# Patient Record
Sex: FEMALE | Race: WHITE | Employment: FULL TIME | URBAN - METROPOLITAN AREA
[De-identification: names, ages, dates, MRNs, and addresses within clinical notes are randomized per-mention and may not be internally consistent; named-entity substitution may affect disease eponyms.]

---

## 2022-03-10 ENCOUNTER — APPOINTMENT (OUTPATIENT)
Dept: CT IMAGING | Facility: CLINIC | Age: 57
End: 2022-03-10
Payer: MEDICARE

## 2022-03-10 ENCOUNTER — HOSPITAL ENCOUNTER (EMERGENCY)
Facility: CLINIC | Age: 57
Discharge: HOME | End: 2022-03-11
Attending: EMERGENCY MEDICINE
Payer: MEDICARE

## 2022-03-10 DIAGNOSIS — J40 BRONCHITIS: ICD-10-CM

## 2022-03-10 DIAGNOSIS — R06.02 SHORTNESS OF BREATH: Primary | ICD-10-CM

## 2022-03-10 LAB
ABSOLUTE EOS #: 0.1 K/UL (ref 0–0.4)
ABSOLUTE LYMPH #: 1 K/UL (ref 1–4.8)
ABSOLUTE MONO #: 0.5 K/UL (ref 0.1–1.2)
ANION GAP SERPL CALCULATED.3IONS-SCNC: 14 MMOL/L (ref 9–17)
BASOPHILS # BLD: 1 % (ref 0–2)
BASOPHILS ABSOLUTE: 0.1 K/UL (ref 0–0.2)
BUN BLDV-MCNC: 8 MG/DL (ref 6–20)
CALCIUM SERPL-MCNC: 9.7 MG/DL (ref 8.6–10.4)
CHLORIDE BLD-SCNC: 93 MMOL/L (ref 98–107)
CO2: 25 MMOL/L (ref 20–31)
CREAT SERPL-MCNC: 0.5 MG/DL (ref 0.5–0.9)
D-DIMER QUANTITATIVE: 0.64 MG/L FEU
DIRECT EXAM: NORMAL
EOSINOPHILS RELATIVE PERCENT: 1 % (ref 1–4)
GFR AFRICAN AMERICAN: >60 ML/MIN
GFR NON-AFRICAN AMERICAN: >60 ML/MIN
GFR SERPL CREATININE-BSD FRML MDRD: ABNORMAL ML/MIN/{1.73_M2}
GLUCOSE BLD-MCNC: 115 MG/DL (ref 70–99)
HCT VFR BLD CALC: 40.1 % (ref 36–46)
HEMOGLOBIN: 13.8 G/DL (ref 12–16)
LYMPHOCYTES # BLD: 6 % (ref 24–44)
MCH RBC QN AUTO: 29.9 PG (ref 26–34)
MCHC RBC AUTO-ENTMCNC: 34.5 G/DL (ref 31–37)
MCV RBC AUTO: 86.6 FL (ref 80–100)
MONOCYTES # BLD: 3 % (ref 2–11)
PDW BLD-RTO: 14.6 % (ref 12.5–15.4)
PLATELET # BLD: 221 K/UL (ref 140–450)
PMV BLD AUTO: 8.6 FL (ref 6–12)
POTASSIUM SERPL-SCNC: 4 MMOL/L (ref 3.7–5.3)
RBC # BLD: 4.63 M/UL (ref 4–5.2)
SEG NEUTROPHILS: 89 % (ref 36–66)
SEGMENTED NEUTROPHILS ABSOLUTE COUNT: 14.3 K/UL (ref 1.8–7.7)
SODIUM BLD-SCNC: 132 MMOL/L (ref 135–144)
SPECIMEN DESCRIPTION: NORMAL
TROPONIN, HIGH SENSITIVITY: 8 NG/L (ref 0–14)
WBC # BLD: 16.1 K/UL (ref 3.5–11)

## 2022-03-10 PROCEDURE — 71260 CT THORAX DX C+: CPT

## 2022-03-10 PROCEDURE — 87804 INFLUENZA ASSAY W/OPTIC: CPT

## 2022-03-10 PROCEDURE — 85025 COMPLETE CBC W/AUTO DIFF WBC: CPT

## 2022-03-10 PROCEDURE — 6360000004 HC RX CONTRAST MEDICATION: Performed by: REGISTERED NURSE

## 2022-03-10 PROCEDURE — 93005 ELECTROCARDIOGRAM TRACING: CPT | Performed by: REGISTERED NURSE

## 2022-03-10 PROCEDURE — U0003 INFECTIOUS AGENT DETECTION BY NUCLEIC ACID (DNA OR RNA); SEVERE ACUTE RESPIRATORY SYNDROME CORONAVIRUS 2 (SARS-COV-2) (CORONAVIRUS DISEASE [COVID-19]), AMPLIFIED PROBE TECHNIQUE, MAKING USE OF HIGH THROUGHPUT TECHNOLOGIES AS DESCRIBED BY CMS-2020-01-R: HCPCS

## 2022-03-10 PROCEDURE — 36415 COLL VENOUS BLD VENIPUNCTURE: CPT

## 2022-03-10 PROCEDURE — 99283 EMERGENCY DEPT VISIT LOW MDM: CPT

## 2022-03-10 PROCEDURE — 2580000003 HC RX 258: Performed by: REGISTERED NURSE

## 2022-03-10 PROCEDURE — 96374 THER/PROPH/DIAG INJ IV PUSH: CPT

## 2022-03-10 PROCEDURE — 85379 FIBRIN DEGRADATION QUANT: CPT

## 2022-03-10 PROCEDURE — 80048 BASIC METABOLIC PNL TOTAL CA: CPT

## 2022-03-10 PROCEDURE — 84484 ASSAY OF TROPONIN QUANT: CPT

## 2022-03-10 PROCEDURE — U0005 INFEC AGEN DETEC AMPLI PROBE: HCPCS

## 2022-03-10 RX ORDER — METOPROLOL SUCCINATE 100 MG/1
TABLET, EXTENDED RELEASE ORAL
COMMUNITY

## 2022-03-10 RX ORDER — 0.9 % SODIUM CHLORIDE 0.9 %
70 INTRAVENOUS SOLUTION INTRAVENOUS ONCE
Status: COMPLETED | OUTPATIENT
Start: 2022-03-10 | End: 2022-03-10

## 2022-03-10 RX ORDER — LOSARTAN POTASSIUM AND HYDROCHLOROTHIAZIDE 25; 100 MG/1; MG/1
TABLET ORAL
COMMUNITY

## 2022-03-10 RX ORDER — MONTELUKAST SODIUM 10 MG/1
TABLET ORAL
COMMUNITY

## 2022-03-10 RX ORDER — 0.9 % SODIUM CHLORIDE 0.9 %
1000 INTRAVENOUS SOLUTION INTRAVENOUS ONCE
Status: COMPLETED | OUTPATIENT
Start: 2022-03-10 | End: 2022-03-10

## 2022-03-10 RX ORDER — SODIUM CHLORIDE 0.9 % (FLUSH) 0.9 %
10 SYRINGE (ML) INJECTION PRN
Status: DISCONTINUED | OUTPATIENT
Start: 2022-03-10 | End: 2022-03-11 | Stop reason: HOSPADM

## 2022-03-10 RX ORDER — OMEPRAZOLE 40 MG/1
CAPSULE, DELAYED RELEASE ORAL
COMMUNITY

## 2022-03-10 RX ORDER — PRIMIDONE 250 MG/1
250 TABLET ORAL 4 TIMES DAILY
COMMUNITY

## 2022-03-10 RX ADMIN — IOPAMIDOL 75 ML: 755 INJECTION, SOLUTION INTRAVENOUS at 23:25

## 2022-03-10 RX ADMIN — SODIUM CHLORIDE 70 ML: 9 INJECTION, SOLUTION INTRAVENOUS at 23:36

## 2022-03-10 RX ADMIN — SODIUM CHLORIDE 1000 ML: 9 INJECTION, SOLUTION INTRAVENOUS at 21:57

## 2022-03-10 ASSESSMENT — ENCOUNTER SYMPTOMS
EYES NEGATIVE: 1
STRIDOR: 0
WHEEZING: 0
COUGH: 1
CHEST TIGHTNESS: 1
GASTROINTESTINAL NEGATIVE: 1
SHORTNESS OF BREATH: 1

## 2022-03-11 VITALS
BODY MASS INDEX: 51.91 KG/M2 | SYSTOLIC BLOOD PRESSURE: 136 MMHG | RESPIRATION RATE: 22 BRPM | TEMPERATURE: 99.3 F | OXYGEN SATURATION: 95 % | HEART RATE: 80 BPM | HEIGHT: 63 IN | WEIGHT: 293 LBS | DIASTOLIC BLOOD PRESSURE: 69 MMHG

## 2022-03-11 PROCEDURE — 6360000002 HC RX W HCPCS: Performed by: EMERGENCY MEDICINE

## 2022-03-11 PROCEDURE — 6370000000 HC RX 637 (ALT 250 FOR IP): Performed by: EMERGENCY MEDICINE

## 2022-03-11 RX ORDER — METHYLPREDNISOLONE SODIUM SUCCINATE 40 MG/ML
40 INJECTION, POWDER, LYOPHILIZED, FOR SOLUTION INTRAMUSCULAR; INTRAVENOUS ONCE
Status: COMPLETED | OUTPATIENT
Start: 2022-03-11 | End: 2022-03-11

## 2022-03-11 RX ORDER — ALBUTEROL SULFATE 90 UG/1
2 AEROSOL, METERED RESPIRATORY (INHALATION) EVERY 4 HOURS PRN
Qty: 18 G | Refills: 0 | Status: SHIPPED | OUTPATIENT
Start: 2022-03-11

## 2022-03-11 RX ORDER — AZITHROMYCIN 250 MG/1
500 TABLET, FILM COATED ORAL ONCE
Status: COMPLETED | OUTPATIENT
Start: 2022-03-11 | End: 2022-03-11

## 2022-03-11 RX ORDER — AZITHROMYCIN 250 MG/1
TABLET, FILM COATED ORAL
Qty: 1 PACKET | Refills: 0 | Status: SHIPPED | OUTPATIENT
Start: 2022-03-11 | End: 2022-03-15

## 2022-03-11 RX ORDER — IPRATROPIUM BROMIDE AND ALBUTEROL SULFATE 2.5; .5 MG/3ML; MG/3ML
1 SOLUTION RESPIRATORY (INHALATION) ONCE
Status: COMPLETED | OUTPATIENT
Start: 2022-03-11 | End: 2022-03-11

## 2022-03-11 RX ORDER — PREDNISONE 10 MG/1
TABLET ORAL
Qty: 20 TABLET | Refills: 0 | Status: SHIPPED | OUTPATIENT
Start: 2022-03-11 | End: 2022-03-21

## 2022-03-11 RX ADMIN — AZITHROMYCIN MONOHYDRATE 500 MG: 250 TABLET ORAL at 00:14

## 2022-03-11 RX ADMIN — METHYLPREDNISOLONE SODIUM SUCCINATE 40 MG: 40 INJECTION, POWDER, FOR SOLUTION INTRAMUSCULAR; INTRAVENOUS at 00:15

## 2022-03-11 RX ADMIN — IPRATROPIUM BROMIDE AND ALBUTEROL SULFATE 1 AMPULE: 2.5; .5 SOLUTION RESPIRATORY (INHALATION) at 00:15

## 2022-03-11 NOTE — ED PROVIDER NOTES
[facility] ED  [address] [facility]  Phone: [phone]      Attending Physician Attestation    I performed a history and physical examination of the patient and discussed management with the mid level provideer. I reviewed the mid level provider's note and agree with the documented findings and plan of care. Any areas of disagreement are noted on the chart. I was personally present for the key portions of any procedures. I have documented in the chart those procedures where I was not present during the key portions. I have reviewed the emergency nurses triage note. I agree with the chief complaint, past medical history, past surgical history, allergies, medications, social and family history as documented unless otherwise noted below. Documentation of the HPI, Physical Exam and Medical Decision Making performed by mid level providers is based on my personal performance of the HPI, PE and MDM. For Physician Assistant/ Nurse Practitioner cases/documentation I have personally evaluated this patient and have completed at least one if not all key elements of the E/M (history, physical exam, and MDM). Additional findings are as noted. CHIEF COMPLAINT       Chief Complaint   Patient presents with    Shortness of Breath         HISTORY OF PRESENT ILLNESS    Aaron Harper is a 61 y.o. female who presents to the emergency department for evaluation of a 3-day history of cough and shortness of breath. Patient states she really just feels like she cannot get a good deep breath. She also states she has had multiple bouts with pneumonia throughout her lifetime. She is afebrile here and has no chest pain. She just took her home took Covid test that was negative. PAST MEDICAL HISTORY    has no past medical history on file. SURGICAL HISTORY      has no past surgical history on file.     CURRENT MEDICATIONS       Previous Medications    LOSARTAN-HYDROCHLOROTHIAZIDE (HYZAAR) 100-25 MG PER TABLET    Hyzaar 100 mg-25 mg tablet   Take 1 tablet every day by oral route. METOPROLOL SUCCINATE (TOPROL XL) 100 MG EXTENDED RELEASE TABLET    Toprol  mg tablet,extended release   Take 1 tablet twice a day by oral route. MONTELUKAST (SINGULAIR) 10 MG TABLET    Singulair 10 mg tablet   Take 1 tablet every day by oral route. OMEPRAZOLE (PRILOSEC) 40 MG DELAYED RELEASE CAPSULE    omeprazole 40 mg capsule,delayed release   Take 1 capsule twice a day by oral route. PRIMIDONE (MYSOLINE) 250 MG TABLET    Take 250 mg by mouth 4 times daily       ALLERGIES     is allergic to adhesive tape, asa [aspirin], and bactrim [sulfamethoxazole-trimethoprim]. FAMILY HISTORY     has no family status information on file. family history is not on file. SOCIAL HISTORY      reports that she has never smoked. She has never used smokeless tobacco. She reports previous alcohol use. She reports that she does not use drugs. PHYSICAL EXAM     INITIAL VITALS:  height is 5' 3\" (1.6 m) and weight is 133.8 kg (295 lb). Her oral temperature is 99.3 °F (37.4 °C). Her blood pressure is 136/69 and her pulse is 80. Her respiration is 22 and oxygen saturation is 95%. Constitutional: Alert, oriented x3, nontoxic, afebrile, answering questions appropriately, acting properly for age, in no acute distress  HEENT: Extraocular muscles intact, mucus membranes moist, TMs clear bilaterally, no posterior pharyngeal erythema or exudates, Pupils equal, round, reactive to light,   Neck: Trachea midline, Supple without lymphadenopathy, no posterior midline neck tenderness to palpation  Cardiovascular: Regular rhythm and rate no S3, S4, or murmurs  Respiratory: Clear to auscultation bilaterally no wheezes, rhonchi, rales, no respiratory distress. Expiratory wheezes noted bilaterally but no accessory muscle use. Gastrointestinal: Soft, nontender, nondistended, positive bowel sounds.   No rebound, rigidity, or guarding. Musculoskeletal: No extremity pain or swelling  Neurologic: Moving all 4 extremities without difficulty there are no gross focal neurologic deficits  Skin: Warm and dry          DIAGNOSTIC RESULTS     EKG: All EKG's are interpreted by the Emergency Department Physician who either signs or Co-signs this chart in the absence of a cardiologist.        RADIOLOGY:   Non-plain film images such as CT, Ultrasound and MRI are read by the radiologist. Ataasuncion Lamb radiographic images are visualized and the radiologist interpretations are reviewed as follows:         LABS:  Results for orders placed or performed during the hospital encounter of 03/10/22   Rapid Influenza A/B Antigens    Specimen: Nasopharyngeal Swab   Result Value Ref Range    Specimen Description . NASOPHARYNGEAL SWAB     Direct Exam       NEGATIVE for Influenza A + B antigens. PCR testing to confirm this result is available upon request.  Specimen will be saved in the laboratory for 7 days. Please call [phone] if PCR testing is indicated.    Basic Metabolic Panel   Result Value Ref Range    Glucose 115 (H) 70 - 99 mg/dL    BUN 8 6 - 20 mg/dL    CREATININE 0.50 0.50 - 0.90 mg/dL    Calcium 9.7 8.6 - 10.4 mg/dL    Sodium 132 (L) 135 - 144 mmol/L    Potassium 4.0 3.7 - 5.3 mmol/L    Chloride 93 (L) 98 - 107 mmol/L    CO2 25 20 - 31 mmol/L    Anion Gap 14 9 - 17 mmol/L    GFR Non-African American >60 >60 mL/min    GFR African American >60 >60 mL/min    GFR Comment         CBC with Auto Differential   Result Value Ref Range    WBC 16.1 (H) 3.5 - 11.0 k/uL    RBC 4.63 4.0 - 5.2 m/uL    Hemoglobin 13.8 12.0 - 16.0 g/dL    Hematocrit 40.1 36 - 46 %    MCV 86.6 80 - 100 fL    MCH 29.9 26 - 34 pg    MCHC 34.5 31 - 37 g/dL    RDW 14.6 12.5 - 15.4 %    Platelets 582 882 - 073 k/uL    MPV 8.6 6.0 - 12.0 fL    Seg Neutrophils 89 (H) 36 - 66 %    Lymphocytes 6 (L) 24 - 44 %    Monocytes 3 2 - 11 %    Eosinophils % 1 1 - 4 % Basophils 1 0 - 2 %    Segs Absolute 14.30 (H) 1.8 - 7.7 k/uL    Absolute Lymph # 1.00 1.0 - 4.8 k/uL    Absolute Mono # 0.50 0.1 - 1.2 k/uL    Absolute Eos # 0.10 0.0 - 0.4 k/uL    Basophils Absolute 0.10 0.0 - 0.2 k/uL   Troponin   Result Value Ref Range    Troponin, High Sensitivity 8 0 - 14 ng/L   D-Dimer, Quantitative   Result Value Ref Range    D-Dimer, Quant 0.64 mg/L FEU                 EMERGENCY DEPARTMENT COURSE:   Vitals:    Vitals:    03/10/22 2043 03/11/22 0004   BP: (!) 188/100 136/69   Pulse: 91 80   Resp: 22    Temp: 99.3 °F (37.4 °C)    TempSrc: Oral    SpO2: 95% 95%   Weight: 133.8 kg (295 lb)    Height: 5' 3\" (1.6 m)      -------------------------  BP: 136/69, Temp: 99.3 °F (37.4 °C), Pulse: 80, Resp: 22      PERTINENT ATTENDING PHYSICIAN COMMENTS:    Patient has gotten laboratory evaluation she has slightly elevated D-dimer so she got a CT scan which did not reveal any pulmonary embolus or groundglass opacities. Patient however however distal have some persistent wheezing we will go ahead and give her on Atrovent and albuterol treatment some steroids and started on some Zithromax. (Please note that portions of this note were completed with a voice recognition program.  Efforts were made to edit the dictations but occasionally words are mis-transcribed.)    Yanick Mcbride MD,, MD, F.A.C.E.P.   Attending Emergency Medicine Physician       Yanick Mcbride MD  03/11/22 2069

## 2022-03-11 NOTE — ED NOTES
The following labs labeled with pt sticker and tubed to lab:     [x] Blue     [x] Lavender   [] on ice  [x] Green/yellow  [] Green/black [] on ice  [] Yellow  [] Red  [] Pink      [x] COVID-19 swab    [] Rapid  [x] PCR  [x] Flu swab  [] Peds Viral Panel     [] Urine Sample  [] Pelvic Cultures  [] Blood Cultures        Ervin Paul RN  03/10/22 9691

## 2022-03-11 NOTE — ED PROVIDER NOTES
[facility] ED  [address] [facility]  Phone: [phone]        Pt Name: Jt Elizabeth  MRN: [id number]  [place] 1965  Date of evaluation: 3/10/22    CHIEFCOMPLAINT       Chief Complaint   Patient presents with    Shortness of Breath       HISTORY OF PRESENT ILLNESS (Location/Symptom, Timing/Onset, Context/Setting, Quality, Duration, Modifying Factors, Severity)      Jt Elizabeth is a 61 y.o. female with no pertinent PMH who presents to the ED via private auto with shortness of breath and cough ongoing since Tuesday this week. States that he has had a continuous cough with intermittent fatigue and states that throughout the day her cough and symptoms tend to get worse. She denies any fevers or chills, chest pain, abdominal pain, nausea vomiting diarrhea but she does report difficulty taking a \"deep breath\" and some chest tightness. She has not taken medication prior arrival for symptoms. She states that nothing makes her symptoms better or worse. She states that she has a history of pneumonia multiple times and states that she is concerned that she is developing a pneumonia. She did take a Covid test at home which was negative. She denies any long car rides, history of cancers, hormone therapy, or recent surgeries, or unilateral leg swelling. She states that she does not smoke as well. PAST MEDICAL / SURGICAL / SOCIAL / FAMILY HISTORY     PMH:  has no past medical history on file. Surgical History:  has no past surgical history on file. Social History:  reports that she has never smoked. She has never used smokeless tobacco. She reports previous alcohol use. She reports that she does not use drugs. Family History: has no family status information on file. family history is not on file.   Psychiatric History: None    Allergies: Adhesive tape, Asa [aspirin], and Bactrim [sulfamethoxazole-trimethoprim]    Home Medications:   Prior to Admission medications Medication Sig Start Date End Date Taking? Authorizing Provider   primidone (MYSOLINE) 250 MG tablet Take 250 mg by mouth 4 times daily   Yes Historical Provider, MD   losartan-hydroCHLOROthiazide (HYZAAR) 100-25 MG per tablet Hyzaar 100 mg-25 mg tablet   Take 1 tablet every day by oral route. Historical Provider, MD   metoprolol succinate (TOPROL XL) 100 MG extended release tablet Toprol  mg tablet,extended release   Take 1 tablet twice a day by oral route. Historical Provider, MD   montelukast (SINGULAIR) 10 MG tablet Singulair 10 mg tablet   Take 1 tablet every day by oral route. Historical Provider, MD   omeprazole (PRILOSEC) 40 MG delayed release capsule omeprazole 40 mg capsule,delayed release   Take 1 capsule twice a day by oral route. Historical Provider, MD       REVIEW OF SYSTEMS  (2-9 systems for level 4, 10 ormore for level 5)      Review of Systems   Constitutional: Positive for fatigue. Negative for chills and fever. HENT: Negative. Eyes: Negative. Respiratory: Positive for cough, chest tightness and shortness of breath. Negative for wheezing and stridor. Cardiovascular: Negative. Gastrointestinal: Negative. Genitourinary: Negative. Musculoskeletal: Negative. Skin: Negative. Neurological: Negative. All other systems negative except as marked. PHYSICAL EXAM  (up to 7 for level 4, 8 or more for level 5)      INITIAL VITALS:  height is 5' 3\" (1.6 m) and weight is 133.8 kg (295 lb). Her oral temperature is 99.3 °F (37.4 °C). Her blood pressure is 188/100 (abnormal) and her pulse is 91. Her respiration is 22 and oxygen saturation is 95%. Vital signs reviewed. Physical Exam  Constitutional:       General: She is not in acute distress. Appearance: She is well-developed. She is obese. She is not toxic-appearing. HENT:      Head: Normocephalic and atraumatic.       Mouth/Throat:      Mouth: Mucous membranes are moist.      Pharynx: Oropharynx is clear. Neck:      Vascular: No JVD. Trachea: No tracheal deviation. Cardiovascular:      Rate and Rhythm: Normal rate and regular rhythm. Pulses: Normal pulses. Heart sounds: Normal heart sounds. Pulmonary:      Effort: Pulmonary effort is normal. Tachypnea present. No accessory muscle usage or respiratory distress. Breath sounds: Normal breath sounds. No stridor. No decreased breath sounds, wheezing, rhonchi or rales. Abdominal:      General: Bowel sounds are normal.      Palpations: Abdomen is soft. There is no hepatomegaly, splenomegaly or mass. Tenderness: There is no abdominal tenderness. There is no guarding. Musculoskeletal:         General: Normal range of motion. Cervical back: Normal range of motion and neck supple. Right lower leg: No tenderness. No edema. Left lower leg: No tenderness. No edema. Lymphadenopathy:      Cervical: No cervical adenopathy. Skin:     General: Skin is warm and dry. Capillary Refill: Capillary refill takes less than 2 seconds. Neurological:      General: No focal deficit present. Mental Status: She is alert. Psychiatric:         Mood and Affect: Mood normal.         Behavior: Behavior normal.           DIFFERENTIAL DIAGNOSIS / MDM     After my physical exam, I do have concern for ACS, PE, flu, Covid, pneumonia to be causing patient symptoms. I will obtain a rapid flu, send out Covid, EKG, troponin, D-dimer and basic labs. CBC shows leukocytosis 16.1. Rapid flu was negative. Troponin was negative. BMP shows mild hyponatremia at 132, we will provide patient with a normal saline bolus at this time. D-dimer is elevated, we will obtain a CTA of the chest to rule out pulmonary embolism. Attending to complete all remaining care, diagnosis and disposition for this patient. We discussed this patient prior to my departure.   My note will be refreshed to reflect these details, though I was not actively involved in this patient's care following the time stamp at 2333. PLAN (LABS / IMAGING / EKG):  Orders Placed This Encounter   Procedures    Rapid Influenza A/B Antigens    CT CHEST PULMONARY EMBOLISM W CONTRAST    Basic Metabolic Panel    CBC with Auto Differential    Troponin    D-Dimer, Quantitative    COVID-19    EKG 12 Lead    Place CT Compatible peripheral IV       MEDICATIONS ORDERED:  Orders Placed This Encounter   Medications    0.9 % sodium chloride bolus    0.9 % sodium chloride bolus    iopamidol (ISOVUE-370) 76 % injection 75 mL    sodium chloride flush 0.9 % injection 10 mL       Controlled Substances Monitoring:     DIAGNOSTIC RESULTS     EKG: All EKG's are interpreted by the Emergency Department Physician who either signs or Co-signs this chart in the absenceof a cardiologist.        RADIOLOGY: All images are read by the radiologist and their interpretations are reviewed. CT CHEST PULMONARY EMBOLISM W CONTRAST    (Results Pending)       No results found. LABS:  Results for orders placed or performed during the hospital encounter of 03/10/22   Rapid Influenza A/B Antigens    Specimen: Nasopharyngeal Swab   Result Value Ref Range    Specimen Description . NASOPHARYNGEAL SWAB     Direct Exam       NEGATIVE for Influenza A + B antigens. PCR testing to confirm this result is available upon request.  Specimen will be saved in the laboratory for 7 days. Please call [phone] if PCR testing is indicated.    Basic Metabolic Panel   Result Value Ref Range    Glucose 115 (H) 70 - 99 mg/dL    BUN 8 6 - 20 mg/dL    CREATININE 0.50 0.50 - 0.90 mg/dL    Calcium 9.7 8.6 - 10.4 mg/dL    Sodium 132 (L) 135 - 144 mmol/L    Potassium 4.0 3.7 - 5.3 mmol/L    Chloride 93 (L) 98 - 107 mmol/L    CO2 25 20 - 31 mmol/L    Anion Gap 14 9 - 17 mmol/L    GFR Non-African American >60 >60 mL/min    GFR African American >60 >60 mL/min    GFR Comment         CBC with Auto Differential   Result Value Ref Range    WBC 16.1 (H) 3.5 - 11.0 k/uL    RBC 4.63 4.0 - 5.2 m/uL    Hemoglobin 13.8 12.0 - 16.0 g/dL    Hematocrit 40.1 36 - 46 %    MCV 86.6 80 - 100 fL    MCH 29.9 26 - 34 pg    MCHC 34.5 31 - 37 g/dL    RDW 14.6 12.5 - 15.4 %    Platelets 863 418 - 156 k/uL    MPV 8.6 6.0 - 12.0 fL    Seg Neutrophils 89 (H) 36 - 66 %    Lymphocytes 6 (L) 24 - 44 %    Monocytes 3 2 - 11 %    Eosinophils % 1 1 - 4 %    Basophils 1 0 - 2 %    Segs Absolute 14.30 (H) 1.8 - 7.7 k/uL    Absolute Lymph # 1.00 1.0 - 4.8 k/uL    Absolute Mono # 0.50 0.1 - 1.2 k/uL    Absolute Eos # 0.10 0.0 - 0.4 k/uL    Basophils Absolute 0.10 0.0 - 0.2 k/uL   Troponin   Result Value Ref Range    Troponin, High Sensitivity 8 0 - 14 ng/L   D-Dimer, Quantitative   Result Value Ref Range    D-Dimer, Quant 0.64 mg/L FEU       EMERGENCY DEPARTMENT COURSE     ED Course as of 03/10/22 2334   Thu Mar 10, 2022   2334 Attending to complete all remaining care, diagnosis and disposition for this patient. We discussed this patient prior to my departure. My note will be refreshed to reflect these details, though I was not actively involved in this patient's care following the time stamp. [TM]      ED Course User Index  [TM] Benoit Charter LEISA Baltazar - CNP        Vitals:    Vitals:    03/10/22 2043   BP: (!) 188/100   Pulse: 91   Resp: 22   Temp: 99.3 °F (37.4 °C)   TempSrc: Oral   SpO2: 95%   Weight: 133.8 kg (295 lb)   Height: 5' 3\" (1.6 m)     -------------------------  BP: (!) 188/100, Temp: 99.3 °F (37.4 °C), Pulse: 91, Resp: 22      RE-EVALUATION:  See ED Course notes above. CONSULTS:  None    PROCEDURES:  None    FINAL IMPRESSION      1. Shortness of breath          DISPOSITION / PLAN     CONDITION ON DISPOSITION:   Stable    PATIENT REFERRED TO:  No follow-up provider specified.     DISCHARGE MEDICATIONS:  New Prescriptions    No medications on file       Bobbi Vallecillo, APRN - [place]   Emergency Medicine Nurse

## 2022-03-11 NOTE — ED NOTES
Pt presents to ED ambulatory a&ox 4. Pt comes with complaints of sob since Tuesday. Pt states she feels like she cannot get a good deep breath. Hx of pneumonia. Denies CP. Pt states that she took at home covid test the other day that was negative.       Lexa Beltran RN  03/10/22 2046 Yes - the patient is able to be screened

## 2022-03-12 LAB
SARS-COV-2: NORMAL
SARS-COV-2: NOT DETECTED
SOURCE: NORMAL

## 2022-03-13 LAB
EKG ATRIAL RATE: 86 BPM
EKG P AXIS: 68 DEGREES
EKG P-R INTERVAL: 120 MS
EKG Q-T INTERVAL: 400 MS
EKG QRS DURATION: 132 MS
EKG QTC CALCULATION (BAZETT): 478 MS
EKG R AXIS: -58 DEGREES
EKG T AXIS: 9 DEGREES
EKG VENTRICULAR RATE: 86 BPM

## 2022-08-02 ENCOUNTER — APPOINTMENT (OUTPATIENT)
Dept: GENERAL RADIOLOGY | Facility: CLINIC | Age: 57
End: 2022-08-02
Payer: MEDICARE

## 2022-08-02 ENCOUNTER — APPOINTMENT (OUTPATIENT)
Dept: CT IMAGING | Facility: CLINIC | Age: 57
End: 2022-08-02
Payer: MEDICARE

## 2022-08-02 ENCOUNTER — HOSPITAL ENCOUNTER (EMERGENCY)
Facility: CLINIC | Age: 57
Discharge: HOME | End: 2022-08-02
Attending: EMERGENCY MEDICINE
Payer: MEDICARE

## 2022-08-02 VITALS
OXYGEN SATURATION: 98 % | RESPIRATION RATE: 16 BRPM | HEART RATE: 67 BPM | TEMPERATURE: 98.2 F | WEIGHT: 293 LBS | HEIGHT: 63 IN | DIASTOLIC BLOOD PRESSURE: 81 MMHG | SYSTOLIC BLOOD PRESSURE: 160 MMHG | BODY MASS INDEX: 51.91 KG/M2

## 2022-08-02 DIAGNOSIS — R42 VERTIGO: Primary | ICD-10-CM

## 2022-08-02 LAB
-: ABNORMAL
ABSOLUTE EOS #: 0.1 K/UL (ref 0–0.4)
ABSOLUTE LYMPH #: 2.1 K/UL (ref 1–4.8)
ABSOLUTE MONO #: 0.3 K/UL (ref 0.1–1.2)
ALBUMIN SERPL-MCNC: 4.5 G/DL (ref 3.5–5.2)
ALBUMIN/GLOBULIN RATIO: 1.4 (ref 1–2.5)
ALP BLD-CCNC: 83 U/L (ref 35–104)
ALT SERPL-CCNC: 13 U/L (ref 5–33)
ANION GAP SERPL CALCULATED.3IONS-SCNC: 12 MMOL/L (ref 9–17)
AST SERPL-CCNC: 16 U/L
BACTERIA: ABNORMAL
BASOPHILS # BLD: 0 % (ref 0–2)
BASOPHILS ABSOLUTE: 0 K/UL (ref 0–0.2)
BILIRUB SERPL-MCNC: 0.4 MG/DL (ref 0.3–1.2)
BILIRUBIN URINE: NEGATIVE
BUN BLDV-MCNC: 11 MG/DL (ref 6–20)
CALCIUM SERPL-MCNC: 9.3 MG/DL (ref 8.6–10.4)
CHLORIDE BLD-SCNC: 101 MMOL/L (ref 98–107)
CO2: 27 MMOL/L (ref 20–31)
COLOR: YELLOW
CREAT SERPL-MCNC: 0.6 MG/DL (ref 0.5–0.9)
EOSINOPHILS RELATIVE PERCENT: 2 % (ref 1–4)
EPITHELIAL CELLS UA: ABNORMAL /HPF (ref 0–5)
GFR AFRICAN AMERICAN: >60 ML/MIN
GFR NON-AFRICAN AMERICAN: >60 ML/MIN
GFR SERPL CREATININE-BSD FRML MDRD: ABNORMAL ML/MIN/{1.73_M2}
GLUCOSE BLD-MCNC: 116 MG/DL (ref 70–99)
GLUCOSE URINE: NEGATIVE
HCT VFR BLD CALC: 43 % (ref 36–46)
HEMOGLOBIN: 14.2 G/DL (ref 12–16)
KETONES, URINE: NEGATIVE
LEUKOCYTE ESTERASE, URINE: ABNORMAL
LYMPHOCYTES # BLD: 28 % (ref 24–44)
MCH RBC QN AUTO: 29.3 PG (ref 26–34)
MCHC RBC AUTO-ENTMCNC: 33.1 G/DL (ref 31–37)
MCV RBC AUTO: 88.4 FL (ref 80–100)
MONOCYTES # BLD: 4 % (ref 2–11)
NITRITE, URINE: NEGATIVE
OTHER OBSERVATIONS UA: ABNORMAL
PDW BLD-RTO: 14.1 % (ref 12.5–15.4)
PH UA: 5.5 (ref 5–8)
PLATELET # BLD: 204 K/UL (ref 140–450)
PMV BLD AUTO: 9.1 FL (ref 6–12)
POTASSIUM SERPL-SCNC: 3.6 MMOL/L (ref 3.7–5.3)
PROTEIN UA: NEGATIVE
RBC # BLD: 4.86 M/UL (ref 4–5.2)
RBC UA: ABNORMAL /HPF (ref 0–2)
SEG NEUTROPHILS: 66 % (ref 36–66)
SEGMENTED NEUTROPHILS ABSOLUTE COUNT: 4.9 K/UL (ref 1.8–7.7)
SODIUM BLD-SCNC: 140 MMOL/L (ref 135–144)
SPECIFIC GRAVITY UA: 1.01 (ref 1–1.03)
TOTAL PROTEIN: 7.7 G/DL (ref 6.4–8.3)
TROPONIN, HIGH SENSITIVITY: 7 NG/L (ref 0–14)
TURBIDITY: CLEAR
URINE HGB: NEGATIVE
UROBILINOGEN, URINE: NORMAL
WBC # BLD: 7.5 K/UL (ref 3.5–11)
WBC UA: ABNORMAL /HPF (ref 0–5)

## 2022-08-02 PROCEDURE — 6360000002 HC RX W HCPCS: Performed by: REGISTERED NURSE

## 2022-08-02 PROCEDURE — 93005 ELECTROCARDIOGRAM TRACING: CPT | Performed by: REGISTERED NURSE

## 2022-08-02 PROCEDURE — 85025 COMPLETE CBC W/AUTO DIFF WBC: CPT

## 2022-08-02 PROCEDURE — 2580000003 HC RX 258: Performed by: REGISTERED NURSE

## 2022-08-02 PROCEDURE — 6370000000 HC RX 637 (ALT 250 FOR IP): Performed by: REGISTERED NURSE

## 2022-08-02 PROCEDURE — 96374 THER/PROPH/DIAG INJ IV PUSH: CPT

## 2022-08-02 PROCEDURE — 99285 EMERGENCY DEPT VISIT HI MDM: CPT

## 2022-08-02 PROCEDURE — 71045 X-RAY EXAM CHEST 1 VIEW: CPT

## 2022-08-02 PROCEDURE — 36415 COLL VENOUS BLD VENIPUNCTURE: CPT

## 2022-08-02 PROCEDURE — 70450 CT HEAD/BRAIN W/O DYE: CPT

## 2022-08-02 PROCEDURE — 80053 COMPREHEN METABOLIC PANEL: CPT

## 2022-08-02 PROCEDURE — 81001 URINALYSIS AUTO W/SCOPE: CPT

## 2022-08-02 PROCEDURE — 84484 ASSAY OF TROPONIN QUANT: CPT

## 2022-08-02 RX ORDER — ONDANSETRON 2 MG/ML
4 INJECTION INTRAMUSCULAR; INTRAVENOUS ONCE
Status: COMPLETED | OUTPATIENT
Start: 2022-08-02 | End: 2022-08-02

## 2022-08-02 RX ORDER — 0.9 % SODIUM CHLORIDE 0.9 %
1000 INTRAVENOUS SOLUTION INTRAVENOUS ONCE
Status: COMPLETED | OUTPATIENT
Start: 2022-08-02 | End: 2022-08-02

## 2022-08-02 RX ORDER — MECLIZINE HCL 12.5 MG/1
25 TABLET ORAL ONCE
Status: COMPLETED | OUTPATIENT
Start: 2022-08-02 | End: 2022-08-02

## 2022-08-02 RX ADMIN — MECLIZINE 25 MG: 12.5 TABLET ORAL at 14:49

## 2022-08-02 RX ADMIN — ONDANSETRON 4 MG: 2 INJECTION INTRAMUSCULAR; INTRAVENOUS at 14:53

## 2022-08-02 RX ADMIN — SODIUM CHLORIDE 1000 ML: 9 INJECTION, SOLUTION INTRAVENOUS at 14:50

## 2022-08-02 ASSESSMENT — ENCOUNTER SYMPTOMS
COUGH: 0
VOMITING: 0
BACK PAIN: 0
NAUSEA: 1
ABDOMINAL PAIN: 0
SHORTNESS OF BREATH: 0
PHOTOPHOBIA: 0
DIARRHEA: 0

## 2022-08-02 NOTE — ED PROVIDER NOTES
[facility]  [address] [facility]  Phone: [phone]        Pt Name: Toni Liu  MRN: [id number]  [place] 1965  Date of evaluation: 8/2/22    [address] [place] [address]       Chief Complaint   Patient presents with    Dizziness     Since Saturday        HISTORY OF PRESENT ILLNESS (Location/Symptom, Timing/Onset, Context/Setting, Quality, Duration, Modifying Factors, Severity)      Toni Liu is a 61 y.o. female with no pertinent PMH who presents to the ED via private auto with intermittent dizziness ongoing for the last 3 days. She states that when she change position and bends at the waist she does exacerbate her dizziness. She denies any numbness or tingling, weakness, chest pain, shortness of breath, difficulty breathing, abdominal pain nausea vomiting diarrhea, fevers or chills. She was seen at her doctor where she was given an antibiotic along with Antivert. States it has not changed her symptoms. She denies any falls or trauma recently. She states that nothing is made her symptoms better and that movement makes her symptoms worse at this time. PAST MEDICAL / SURGICAL / SOCIAL / FAMILY HISTORY     PMH:  has no past medical history on file. Surgical History:  has no past surgical history on file. Social History:  reports that she has never smoked. She has never used smokeless tobacco. She reports that she does not currently use alcohol. She reports that she does not use drugs. Family History: has no family status information on file. family history is not on file. Psychiatric History: None    Allergies: Adhesive tape, Asa [aspirin], and Bactrim [sulfamethoxazole-trimethoprim]    Home Medications:   Prior to Admission medications    Medication Sig Start Date End Date Taking?  Authorizing Provider   albuterol sulfate HFA (PROVENTIL HFA) 108 (90 Base) MCG/ACT inhaler Inhale 2 puffs into the lungs every 4 hours as needed for Wheezing or Shortness of Breath (Space out to every 6 hours as symptoms improve) Space out to every 6 hours as symptoms improve. 3/11/22   Tolu Braun III, MD   losartan-hydroCHLOROthiazide (HYZAAR) 100-25 MG per tablet Hyzaar 100 mg-25 mg tablet   Take 1 tablet every day by oral route. Historical Provider, MD   metoprolol succinate (TOPROL XL) 100 MG extended release tablet Toprol  mg tablet,extended release   Take 1 tablet twice a day by oral route. Historical Provider, MD   montelukast (SINGULAIR) 10 MG tablet Singulair 10 mg tablet   Take 1 tablet every day by oral route. Historical Provider, MD   omeprazole (PRILOSEC) 40 MG delayed release capsule omeprazole 40 mg capsule,delayed release   Take 1 capsule twice a day by oral route. Historical Provider, MD   primidone (MYSOLINE) 250 MG tablet Take 250 mg by mouth 4 times daily    Historical Provider, MD       REVIEW OF SYSTEMS  (2-9 systems for level 4, 10 ormore for level 5)      Review of Systems   Constitutional:  Negative for chills and fever. HENT:  Negative for congestion, ear discharge, ear pain and tinnitus. Eyes:  Negative for photophobia and visual disturbance. Respiratory:  Negative for cough and shortness of breath. Cardiovascular:  Negative for chest pain, palpitations and leg swelling. Gastrointestinal:  Positive for nausea. Negative for abdominal pain, diarrhea and vomiting. Genitourinary:  Negative for dysuria and hematuria. Musculoskeletal:  Negative for back pain and neck pain. Neurological:  Positive for dizziness. Negative for syncope, facial asymmetry, speech difficulty, weakness, light-headedness, numbness and headaches. All other systems negative except as marked. PHYSICAL EXAM  (up to 7 for level 4, 8 or more for level 5)      INITIAL VITALS:  height is 5' 3\" (1.6 m) and weight is 133.8 kg (295 lb). Her oral temperature is 98.2 °F (36.8 °C). Her blood pressure is 160/81 (abnormal) and her pulse is 67.  Her respiration is 16 and oxygen saturation is 98%. Vital signs reviewed. Physical Exam  Constitutional:       General: She is not in acute distress. Appearance: Normal appearance. She is obese. She is not ill-appearing or toxic-appearing. HENT:      Head: Normocephalic and atraumatic. Right Ear: Tympanic membrane, ear canal and external ear normal.      Left Ear: Tympanic membrane, ear canal and external ear normal.      Nose: Nose normal.      Mouth/Throat:      Mouth: Mucous membranes are moist.      Pharynx: Oropharynx is clear. No oropharyngeal exudate or posterior oropharyngeal erythema. Eyes:      General: No scleral icterus. Right eye: No discharge. Left eye: No discharge. Extraocular Movements: Extraocular movements intact. Conjunctiva/sclera: Conjunctivae normal.      Pupils: Pupils are equal, round, and reactive to light. Cardiovascular:      Rate and Rhythm: Normal rate and regular rhythm. Pulses: Normal pulses. Heart sounds: Normal heart sounds. Pulmonary:      Effort: Pulmonary effort is normal.      Breath sounds: Normal breath sounds. Abdominal:      General: There is no distension. Palpations: Abdomen is soft. Tenderness: There is no abdominal tenderness. There is no guarding. Hernia: No hernia is present. Musculoskeletal:      Cervical back: Neck supple. No rigidity. Skin:     General: Skin is warm and dry. Capillary Refill: Capillary refill takes less than 2 seconds. Neurological:      General: No focal deficit present. Mental Status: She is alert and oriented to person, place, and time. Cranial Nerves: No cranial nerve deficit. Sensory: No sensory deficit. Motor: No weakness.       Coordination: Coordination normal.   Psychiatric:         Mood and Affect: Mood normal.         Behavior: Behavior normal.         DIFFERENTIAL DIAGNOSIS / MDM     Plan obtain basic cardiac work-up, UA, CT head, provide the patient with Antivert, Zofran, saline bolus for symptoms. EKG does not show any acute changes or signs of acute cardiac ischemia. Blood work is unremarkable. Urine is unremarkable. CT head shows no acute intracranial abnormality. Chest x-ray shows cardiomegaly and vascular congestion, mild with small bilateral effusions probable basilar atelectasis or mild interstitial edema. Patient states she feels better after resting and medication. Do believe patient symptoms are related to vertigo instructed her to follow-up with her PCP within 1 day. Instructed patient to return with any worsening dizziness, headache, vision changes, nausea vomiting diarrhea, chest pain or shortness of breath. Patient is agreement to this plan at this time. All question concerns were answered at this time. The patient presents with dizziness without signs of CNS bleed, stroke, infection, temporal arteritis, idiopathic intracranial hypertension, or other serious etiology. The patient is neurologically intact. Given the extremely low risk of these diagnoses further testing and evaluation for these possibilities does not appear to be indicated at this time. The patient appears stable for discharge and has been instructed to return immediately if the symptoms worsen in any way, or in 8-12 hr if not improved for re-evaluation. We also discussed returning to the Emergency Department immediately if new or worsening symptoms occur. We have discussed the symptoms which are most concerning (e.g., changing or worsening pain, visual or hearing changes, numbness or weakness, fever, stiff neck, or rash) that necessitate immediate return. The patient understands that at this time there is no evidence for a more malignant underlying process, but the patient also understands that early in the process of an illness or injury, an emergency department workup can be falsely reassuring.   Routine discharge counseling was given, and the patient understands that worsening, changing or persistent symptoms should prompt an immediate call or follow up with their primary physician or return to the emergency department. The importance of appropriate follow up was also discussed. I have reviewed the disposition diagnosis with the patient and or their family/guardian. I have answered their questions and given discharge instructions. They voiced understanding of these instructions and did not have any further questions or complaints. PLAN (LABS / IMAGING / EKG):  Orders Placed This Encounter   Procedures    XR CHEST PORTABLE    CT HEAD WO CONTRAST    CBC with Auto Differential    Comprehensive Metabolic Panel    Troponin    Urinalysis with Reflex to Culture    Microscopic Urinalysis    EKG 12 Lead       MEDICATIONS ORDERED:  Orders Placed This Encounter   Medications    meclizine (ANTIVERT) tablet 25 mg    0.9 % sodium chloride bolus    ondansetron (ZOFRAN) injection 4 mg       Controlled Substances Monitoring:     DIAGNOSTIC RESULTS     EKG: All EKG's are interpreted by the Emergency Department Physician who either signs or Co-signs this chart in the absenceof a cardiologist.    1433 EKG is interpreted myself as normal sinus rhythm with ventricular rate of 70, OH interval 132, QRS duration 136, QTc 462 with left axis deviation with no acute ST changes at this time. EKG is unchanged from 3/10/2022. RADIOLOGY: All images are read by the radiologist and their interpretations are reviewed. CT HEAD WO CONTRAST   Final Result   No acute intracranial abnormality. XR CHEST PORTABLE   Final Result   Cardiomegaly and vascular congestion, mild with small bilateral effusions. Probable basilar atelectasis or mild interstitial edema. No results found.     LABS:  Results for orders placed or performed during the hospital encounter of 08/02/22   CBC with Auto Differential   Result Value Ref Range    WBC 7.5 3.5 - 11.0 k/uL    RBC 4.86 4.0 - 5.2 m/uL Hemoglobin 14.2 12.0 - 16.0 g/dL    Hematocrit 43.0 36 - 46 %    MCV 88.4 80 - 100 fL    MCH 29.3 26 - 34 pg    MCHC 33.1 31 - 37 g/dL    RDW 14.1 12.5 - 15.4 %    Platelets 025 823 - 490 k/uL    MPV 9.1 6.0 - 12.0 fL    Seg Neutrophils 66 36 - 66 %    Lymphocytes 28 24 - 44 %    Monocytes 4 2 - 11 %    Eosinophils % 2 1 - 4 %    Basophils 0 0 - 2 %    Segs Absolute 4.90 1.8 - 7.7 k/uL    Absolute Lymph # 2.10 1.0 - 4.8 k/uL    Absolute Mono # 0.30 0.1 - 1.2 k/uL    Absolute Eos # 0.10 0.0 - 0.4 k/uL    Basophils Absolute 0.00 0.0 - 0.2 k/uL   Comprehensive Metabolic Panel   Result Value Ref Range    Glucose 116 (H) 70 - 99 mg/dL    BUN 11 6 - 20 mg/dL    Creatinine 0.60 0.50 - 0.90 mg/dL    Calcium 9.3 8.6 - 10.4 mg/dL    Sodium 140 135 - 144 mmol/L    Potassium 3.6 (L) 3.7 - 5.3 mmol/L    Chloride 101 98 - 107 mmol/L    CO2 27 20 - 31 mmol/L    Anion Gap 12 9 - 17 mmol/L    Alkaline Phosphatase 83 35 - 104 U/L    ALT 13 5 - 33 U/L    AST 16 <32 U/L    Total Bilirubin 0.40 0.3 - 1.2 mg/dL    Total Protein 7.7 6.4 - 8.3 g/dL    Albumin 4.5 3.5 - 5.2 g/dL    Albumin/Globulin Ratio 1.4 1.0 - 2.5    GFR Non-African American >60 >60 mL/min    GFR African American >60 >60 mL/min    GFR Comment         Troponin   Result Value Ref Range    Troponin, High Sensitivity 7 0 - 14 ng/L   Urinalysis with Reflex to Culture    Specimen: Urine   Result Value Ref Range    Color, UA Yellow Yellow    Turbidity UA Clear Clear    Glucose, Ur NEGATIVE NEGATIVE    Bilirubin Urine NEGATIVE NEGATIVE    Ketones, Urine NEGATIVE NEGATIVE    Specific Gravity, UA 1.010 1.005 - 1.030    Urine Hgb NEGATIVE NEGATIVE    pH, UA 5.5 5.0 - 8.0    Protein, UA NEGATIVE NEGATIVE    Urobilinogen, Urine Normal Normal    Nitrite, Urine NEGATIVE NEGATIVE    Leukocyte Esterase, Urine TRACE (A) NEGATIVE   Microscopic Urinalysis   Result Value Ref Range    -          WBC, UA 0 TO 2 0 - 5 /HPF    RBC, UA 0 TO 2 0 - 2 /HPF    Epithelial Cells UA 2 TO 5 0 - 5 /HPF

## 2022-08-02 NOTE — ED PROVIDER NOTES
[facility] ED    [address] [facility]  Phone: [phone]  Emergency Department  Faculty Attestation    I performed a history and physical examination of the patient and discussed management with the mid level provideer. I reviewed the mid level provider's note and agree with the documented findings and plan of care. Any areas of disagreement are noted on the chart. I was personally present for the key portions of any procedures. I have documented in the chart those procedures where I was not present during the key portions. I have reviewed the emergency nurses triage note. I agree with the chief complaint, past medical history, past surgical history, allergies, medications, social and family history as documented unless otherwise noted below. Documentation of the HPI, Physical Exam and Medical Decision Making performed by medical students or scribes is based on my personal performance of the HPI, PE and MDM. For Physician Assistant/ Nurse Practitioner cases/documentation I have personally evaluated this patient and have completed at least one if not all key elements of the E/M (history, physical exam, and MDM). Additional findings are as noted.       Primary Care Physician:  [address] [place] [address]       Chief Complaint   Patient presents with    Dizziness     Since Saturday        RECENT VITALS:   Temp: 98.2 °F (36.8 °C),  Heart Rate: 67, Resp: 16, BP: (!) 160/81    LABS:  Labs Reviewed   COMPREHENSIVE METABOLIC PANEL - Abnormal; Notable for the following components:       Result Value    Glucose 116 (*)     Potassium 3.6 (*)     All other components within normal limits   URINALYSIS WITH REFLEX TO CULTURE - Abnormal; Notable for the following components:    Leukocyte Esterase, Urine TRACE (*)     All other components within normal limits   MICROSCOPIC URINALYSIS - Abnormal; Notable for the following components:    Bacteria, UA FEW (*)     Other Observations UA   (*) Value: Utilizing a urinalysis as the only screening method to exclude a potential uropathogen can be unreliable in many patient populations. Rapid screening tests are less sensitive than culture and if UTI is a clinical possibility, culture should be considered despite a negative urinalysis. All other components within normal limits   CBC WITH AUTO DIFFERENTIAL   TROPONIN          CT HEAD WO CONTRAST (Final result)  Result time 08/02/22 15:52:51  Final result by Jim Langley MD (08/02/22 15:52:51)                Impression:    No acute intracranial abnormality. Narrative:    EXAMINATION:   CT OF THE HEAD WITHOUT CONTRAST  8/2/2022 1:53 pm     TECHNIQUE:   CT of the head was performed without the administration of intravenous   contrast. Automated exposure control, iterative reconstruction, and/or weight   based adjustment of the mA/kV was utilized to reduce the radiation dose to as   low as reasonably achievable. COMPARISON:   None. HISTORY:   ORDERING SYSTEM PROVIDED HISTORY: dizziness   TECHNOLOGIST PROVIDED HISTORY:   dizziness     Decision Support Exception - unselect if not a suspected or confirmed   emergency medical condition->Emergency Medical Condition (MA)   Reason for Exam: Pt c/o dizziness x 3 days without head injury     FINDINGS:   BRAIN/VENTRICLES: There is no acute intracranial hemorrhage, mass effect or   midline shift. No abnormal extra-axial fluid collection. The gray-white   differentiation is maintained without evidence of an acute infarct. There is   no evidence of hydrocephalus. ORBITS: The visualized portion of the orbits demonstrate no acute abnormality. SINUSES: The visualized paranasal sinuses and mastoid air cells demonstrate   no acute abnormality. SOFT TISSUES/SKULL:  No acute abnormality of the visualized skull or soft   tissues.                        XR CHEST PORTABLE (Final result)  Result time 08/02/22 15:29:27  Final result by Jim Langley MD (08/02/22 15:29:27)                Impression:    Cardiomegaly and vascular congestion, mild with small bilateral effusions. Probable basilar atelectasis or mild interstitial edema. Narrative:    EXAMINATION:   ONE XRAY VIEW OF THE CHEST     8/2/2022 1:46 pm     COMPARISON:   None. HISTORY:   ORDERING SYSTEM PROVIDED HISTORY: dizziness   TECHNOLOGIST PROVIDED HISTORY:   dizziness   Reason for Exam: pt c/o dizziness without acute chest complaints     FINDINGS:   AP portable view of the chest time stamped at 1459 hours demonstrates   overlying cardiac monitoring electrodes, borderline cardiomegaly and vascular   congestion with small effusions. Haziness is present at the bases which may   be on the basis of edema or atelectasis less likely pneumonitis process. No   extrapleural air. PERTINENT ATTENDING PHYSICIAN COMMENTS:    EKG: Emergency physician interpretation: Sinus 70 with nonspecific ST change. Incomplete right bundle branch block pattern. Axis -49, , , . No change from previous. The patient presents with vertigo. She has been dizzy since Saturday. It is now Tuesday. She saw her PCP who wrote for meclizine. She says it is not really helping. 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 She is not having a headache. She denies trauma. Her symptoms started suddenly when she got out of bed on Saturday. On exam, the patient appears to be in mild distress due to dizziness but not due to nausea. She is very frustrated. She has no focal deficits and has no no sensory or motor loss in the face or extremities. She does not want to participate Hallpike maneuver because she says it will make her too dizzy. It seems, however, that her symptoms are worse when she turns her head to the right side or leans over on that side. The patient received antiemetics and meclizine.   Her work-up is reassuring. I do think she likely has benign positional vertigo. I explained the nature of this to her and her family. She is advised to avoid looking up, bending over, or laying on the affected side. The patient will be discharged and may continue the meclizine she was already prescribed. The patient is discharged in good condition.         Ailyn Oneill MD  08/03/22 [id number]

## 2022-08-02 NOTE — DISCHARGE INSTRUCTIONS
Please understand that at this time there is no evidence for a more serious underlying process, but that early in the process of an illness or injury, an emergency department workup can be falsely reassuring. You should contact your family doctor within the next 48 hours for a follow up appointment    Ade Gonzalez!!!    From [facility] ([facility]) and [facility] Emergency Services    On behalf of the Emergency Department staff at [facility]), I would like to thank you for giving us the opportunity to address your health care needs and concerns. We hope that during your visit, our service was delivered in a professional and caring manner. Please keep [facility] ([facility]) in mind as we walk with you down the path to your own personal wellness. Please expect an automated text message or email from us so we can ask a few questions about your health and progress. Based on your answers, a clinician may call you back to offer help and instructions. Please understand that early in the process of an illness or injury, an emergency department workup can be falsely reassuring. If you notice any worsening, changing or persistent symptoms please call your family doctor or return to the ER immediately. Tell us how we did during your visit at [email or web address]   and let us know about your experience

## 2022-08-02 NOTE — ED NOTES
Pt to ED with complaints of dizziness since Saturday. Pt states that she spoke to her PCP and was written a prescription for meclizine to treat for vertigo. Pt states that her dizziness hasnt subsided. She states that the dizziness comes and goes and the duration is random. Pt denies any chest pain or SOB.  Pt able to ambulate to ED without incident      Jose Orourke RN  08/02/22 2099

## 2022-08-03 LAB
EKG ATRIAL RATE: 70 BPM
EKG P AXIS: 10 DEGREES
EKG P-R INTERVAL: 132 MS
EKG Q-T INTERVAL: 428 MS
EKG QRS DURATION: 136 MS
EKG QTC CALCULATION (BAZETT): 462 MS
EKG R AXIS: -49 DEGREES
EKG VENTRICULAR RATE: 70 BPM

## 2023-01-04 ENCOUNTER — APPOINTMENT (OUTPATIENT)
Dept: CT IMAGING | Facility: CLINIC | Age: 58
End: 2023-01-04
Payer: MEDICARE

## 2023-01-04 ENCOUNTER — APPOINTMENT (OUTPATIENT)
Dept: GENERAL RADIOLOGY | Facility: CLINIC | Age: 58
End: 2023-01-04
Payer: MEDICARE

## 2023-01-04 ENCOUNTER — HOSPITAL ENCOUNTER (EMERGENCY)
Facility: CLINIC | Age: 58
Discharge: HOME | End: 2023-01-04
Attending: EMERGENCY MEDICINE
Payer: MEDICARE

## 2023-01-04 VITALS
TEMPERATURE: 98.7 F | OXYGEN SATURATION: 97 % | HEIGHT: 63 IN | HEART RATE: 74 BPM | SYSTOLIC BLOOD PRESSURE: 161 MMHG | DIASTOLIC BLOOD PRESSURE: 76 MMHG | BODY MASS INDEX: 51.56 KG/M2 | WEIGHT: 291 LBS | RESPIRATION RATE: 20 BRPM

## 2023-01-04 DIAGNOSIS — R05.3 CHRONIC COUGH: Primary | ICD-10-CM

## 2023-01-04 LAB
ABSOLUTE EOS #: 0.2 K/UL (ref 0–0.4)
ABSOLUTE LYMPH #: 1.9 K/UL (ref 1–4.8)
ABSOLUTE MONO #: 0.4 K/UL (ref 0.1–1.2)
ANION GAP SERPL CALCULATED.3IONS-SCNC: 11 MMOL/L (ref 9–17)
BASOPHILS # BLD: 1 % (ref 0–2)
BASOPHILS ABSOLUTE: 0.1 K/UL (ref 0–0.2)
BUN BLDV-MCNC: 9 MG/DL (ref 6–20)
CALCIUM SERPL-MCNC: 9.3 MG/DL (ref 8.6–10.4)
CHLORIDE BLD-SCNC: 102 MMOL/L (ref 98–107)
CO2: 27 MMOL/L (ref 20–31)
CREAT SERPL-MCNC: 0.4 MG/DL (ref 0.5–0.9)
D-DIMER QUANTITATIVE: 0.41 MG/L FEU
EOSINOPHILS RELATIVE PERCENT: 2 % (ref 1–4)
FLU A ANTIGEN: NEGATIVE
FLU B ANTIGEN: NEGATIVE
GFR SERPL CREATININE-BSD FRML MDRD: >60 ML/MIN/1.73M2
GLUCOSE BLD-MCNC: 110 MG/DL (ref 70–99)
HCT VFR BLD CALC: 40.1 % (ref 36–46)
HEMOGLOBIN: 13.1 G/DL (ref 12–16)
LYMPHOCYTES # BLD: 17 % (ref 24–44)
MCH RBC QN AUTO: 28.8 PG (ref 26–34)
MCHC RBC AUTO-ENTMCNC: 32.7 G/DL (ref 31–37)
MCV RBC AUTO: 88 FL (ref 80–100)
MONOCYTES # BLD: 4 % (ref 2–11)
PDW BLD-RTO: 14.9 % (ref 12.5–15.4)
PLATELET # BLD: 214 K/UL (ref 140–450)
PMV BLD AUTO: 9.7 FL (ref 6–12)
POTASSIUM SERPL-SCNC: 3.7 MMOL/L (ref 3.7–5.3)
PRO-BNP: 288 PG/ML
RBC # BLD: 4.55 M/UL (ref 4–5.2)
SARS-COV-2, RAPID: NOT DETECTED
SEG NEUTROPHILS: 76 % (ref 36–66)
SEGMENTED NEUTROPHILS ABSOLUTE COUNT: 8.2 K/UL (ref 1.8–7.7)
SODIUM BLD-SCNC: 140 MMOL/L (ref 135–144)
SPECIMEN DESCRIPTION: NORMAL
TROPONIN, HIGH SENSITIVITY: 9 NG/L (ref 0–14)
WBC # BLD: 10.7 K/UL (ref 3.5–11)

## 2023-01-04 PROCEDURE — 71260 CT THORAX DX C+: CPT

## 2023-01-04 PROCEDURE — 93005 ELECTROCARDIOGRAM TRACING: CPT | Performed by: EMERGENCY MEDICINE

## 2023-01-04 PROCEDURE — 2580000003 HC RX 258: Performed by: EMERGENCY MEDICINE

## 2023-01-04 PROCEDURE — 36415 COLL VENOUS BLD VENIPUNCTURE: CPT

## 2023-01-04 PROCEDURE — 71045 X-RAY EXAM CHEST 1 VIEW: CPT

## 2023-01-04 PROCEDURE — 87635 SARS-COV-2 COVID-19 AMP PRB: CPT

## 2023-01-04 PROCEDURE — 85379 FIBRIN DEGRADATION QUANT: CPT

## 2023-01-04 PROCEDURE — 87804 INFLUENZA ASSAY W/OPTIC: CPT

## 2023-01-04 PROCEDURE — 99285 EMERGENCY DEPT VISIT HI MDM: CPT

## 2023-01-04 PROCEDURE — 83880 ASSAY OF NATRIURETIC PEPTIDE: CPT

## 2023-01-04 PROCEDURE — 80048 BASIC METABOLIC PNL TOTAL CA: CPT

## 2023-01-04 PROCEDURE — 6360000004 HC RX CONTRAST MEDICATION: Performed by: EMERGENCY MEDICINE

## 2023-01-04 PROCEDURE — 84484 ASSAY OF TROPONIN QUANT: CPT

## 2023-01-04 PROCEDURE — 85025 COMPLETE CBC W/AUTO DIFF WBC: CPT

## 2023-01-04 RX ORDER — 0.9 % SODIUM CHLORIDE 0.9 %
70 INTRAVENOUS SOLUTION INTRAVENOUS ONCE
Status: COMPLETED | OUTPATIENT
Start: 2023-01-04 | End: 2023-01-04

## 2023-01-04 RX ORDER — SODIUM CHLORIDE 0.9 % (FLUSH) 0.9 %
10 SYRINGE (ML) INJECTION PRN
Status: DISCONTINUED | OUTPATIENT
Start: 2023-01-04 | End: 2023-01-04 | Stop reason: HOSPADM

## 2023-01-04 RX ADMIN — SODIUM CHLORIDE 70 ML: 9 INJECTION, SOLUTION INTRAVENOUS at 16:22

## 2023-01-04 RX ADMIN — IOPAMIDOL 80 ML: 755 INJECTION, SOLUTION INTRAVENOUS at 16:22

## 2023-01-04 RX ADMIN — SODIUM CHLORIDE, PRESERVATIVE FREE 10 ML: 5 INJECTION INTRAVENOUS at 16:22

## 2023-01-04 ASSESSMENT — ENCOUNTER SYMPTOMS
ABDOMINAL PAIN: 0
NAUSEA: 0
DIARRHEA: 0
BACK PAIN: 0
SHORTNESS OF BREATH: 1
WHEEZING: 0
COUGH: 1
SORE THROAT: 0
VOMITING: 0

## 2023-01-04 ASSESSMENT — PAIN - FUNCTIONAL ASSESSMENT: PAIN_FUNCTIONAL_ASSESSMENT: NONE - DENIES PAIN

## 2023-01-04 NOTE — ED PROVIDER NOTES
[facility] ED  [address] [facility]  Phone: [phone]    eMERGENCY dEPARTMENT eNCOUnter        Pt Name: Toni Liu  MRN: [id number]  [place] 1965  Date of evaluation: 1/4/23    CHIEF COMPLAINT     Chief Complaint   Patient presents with    Shortness of Breath       HISTORY OF PRESENT ILLNESS    Toni Liu is a 61 y.o. female who presents to the emergency department from home with ongoing cough. It began October 11 and she really has not recovered. He had COVID back in August for about a week and a half. She stated was short-lived and not that severe. Patient stated she does have history of asthma as well as pneumonia. She states she faithfully does 3 nebulizer breathing treatments a day and albuterol rescue inhaler least twice a day. She has been on multiple rounds of antibiotic and following up with Dr. Alvin Rader her family doctor she is now been referred on to Dr. Osmany Major pulmonologist but her appointments not till the end of January. She admits that since October she runs a subjective fever breaks down cold sweats and chills once a week. She has documented the temperature to between 101 and 102 on occasion. She stated that she takes Tylenol about 4 to 5 hours later it resolved. She admits she has been on 6 rounds of antibiotics since onset of symptoms;just completing course recently. REVIEW OF SYSTEMS     Review of Systems   Constitutional:  Negative for chills and fever. HENT:  Negative for congestion, ear pain and sore throat. Respiratory:  Positive for cough and shortness of breath. Negative for wheezing. Cardiovascular:  Negative for chest pain, palpitations and leg swelling. Gastrointestinal:  Negative for abdominal pain, diarrhea, nausea and vomiting. Genitourinary:  Negative for dysuria, flank pain, frequency and hematuria. Musculoskeletal:  Negative for back pain. Skin:  Negative for rash.    Neurological:  Negative for dizziness, light-headedness, numbness and headaches. PAST MEDICAL HISTORY    has no past medical history on file. SURGICAL HISTORY      has no past surgical history on file. CURRENT MEDICATIONS       Previous Medications    ALBUTEROL SULFATE HFA (PROVENTIL HFA) 108 (90 BASE) MCG/ACT INHALER    Inhale 2 puffs into the lungs every 4 hours as needed for Wheezing or Shortness of Breath (Space out to every 6 hours as symptoms improve) Space out to every 6 hours as symptoms improve. LOSARTAN-HYDROCHLOROTHIAZIDE (HYZAAR) 100-25 MG PER TABLET    Hyzaar 100 mg-25 mg tablet   Take 1 tablet every day by oral route. METOPROLOL SUCCINATE (TOPROL XL) 100 MG EXTENDED RELEASE TABLET    Toprol  mg tablet,extended release   Take 1 tablet twice a day by oral route. MONTELUKAST (SINGULAIR) 10 MG TABLET    Singulair 10 mg tablet   Take 1 tablet every day by oral route. OMEPRAZOLE (PRILOSEC) 40 MG DELAYED RELEASE CAPSULE    omeprazole 40 mg capsule,delayed release   Take 1 capsule twice a day by oral route. PRIMIDONE (MYSOLINE) 250 MG TABLET    Take 250 mg by mouth 4 times daily       ALLERGIES     is allergic to adhesive tape, asa [aspirin], and bactrim [sulfamethoxazole-trimethoprim]. FAMILY HISTORY     has no family status information on file. family history is not on file. SOCIAL HISTORY      reports that she has never smoked. She has never used smokeless tobacco. She reports that she does not currently use alcohol. She reports that she does not use drugs. PHYSICAL EXAM     INITIAL VITALS:  height is 5' 3\" (1.6 m) and weight is 132 kg (291 lb). Her oral temperature is 98.7 °F (37.1 °C). Her blood pressure is 161/76 (abnormal) and her pulse is 74. Her respiration is 20 and oxygen saturation is 97%. Physical Exam  Vitals and nursing note reviewed. Constitutional:       Appearance: Normal appearance. HENT:      Head: Normocephalic and atraumatic.       Nose: Nose normal.      Mouth/Throat: Mouth: Mucous membranes are moist.   Eyes:      Extraocular Movements: Extraocular movements intact. Pupils: Pupils are equal, round, and reactive to light. Cardiovascular:      Rate and Rhythm: Normal rate and regular rhythm. Pulses: Normal pulses. Heart sounds: Normal heart sounds. Pulmonary:      Effort: Pulmonary effort is normal.      Breath sounds: Normal breath sounds. Abdominal:      General: Abdomen is flat. Bowel sounds are normal.      Palpations: Abdomen is soft. Comments: No pulsatile mass   Musculoskeletal:         General: Normal range of motion. Cervical back: Normal range of motion and neck supple. Skin:     General: Skin is warm and dry. Neurological:      General: No focal deficit present. Mental Status: She is alert and oriented to person, place, and time. Mental status is at baseline. Psychiatric:         Mood and Affect: Mood normal.         Behavior: Behavior normal.     DIFFERENTIAL DIAGNOSIS / MDM / EMERGENCY DEPARTMENT COURSE:     The patient was personally seen and evaluated by myself. She is not hypoxic tachycardic nor in any distress. An IV will be established blood work will be drawn and sent. EKG was performed did not show any acute process. We will end up scanning the patient's chest.  She stated she had a chest x-ray approximately a month ago and a CAT scan of the chest back in March. Patient's work-up here in the emergency department did not reveal any acute process. The extensive workup did not reveal any acute concerns. The pt is just simply frustrated. She has been on 6 rounds of steroids and antibiotics, just recently. There is no indication for any additional tx. She already has bronchodilators, mdi as well as nebulizers. She will continue them and keep her fu appt with pulmonlogist as scheduled with Dr. Sarahi Carranza.       DIAGNOSTIC RESULTS     LABS:  Results for orders placed or performed during the hospital encounter of 01/04/23 COVID-19, Rapid    Specimen: Nasopharyngeal Swab   Result Value Ref Range    Specimen Description . NASOPHARYNGEAL SWAB     SARS-CoV-2, Rapid Not Detected Not Detected   Rapid Influenza A/B Antigens    Specimen: Nasopharyngeal   Result Value Ref Range    Flu A Antigen NEGATIVE NEGATIVE    Flu B Antigen NEGATIVE NEGATIVE   CBC with Auto Differential   Result Value Ref Range    WBC 10.7 3.5 - 11.0 k/uL    RBC 4.55 4.0 - 5.2 m/uL    Hemoglobin 13.1 12.0 - 16.0 g/dL    Hematocrit 40.1 36 - 46 %    MCV 88.0 80 - 100 fL    MCH 28.8 26 - 34 pg    MCHC 32.7 31 - 37 g/dL    RDW 14.9 12.5 - 15.4 %    Platelets 322 857 - 237 k/uL    MPV 9.7 6.0 - 12.0 fL    Seg Neutrophils 76 (H) 36 - 66 %    Lymphocytes 17 (L) 24 - 44 %    Monocytes 4 2 - 11 %    Eosinophils % 2 1 - 4 %    Basophils 1 0 - 2 %    Segs Absolute 8.20 (H) 1.8 - 7.7 k/uL    Absolute Lymph # 1.90 1.0 - 4.8 k/uL    Absolute Mono # 0.40 0.1 - 1.2 k/uL    Absolute Eos # 0.20 0.0 - 0.4 k/uL    Basophils Absolute 0.10 0.0 - 0.2 k/uL   D-Dimer, Quantitative   Result Value Ref Range    D-Dimer, Quant 0.41 mg/L FEU   BMP   Result Value Ref Range    Glucose 110 (H) 70 - 99 mg/dL    BUN 9 6 - 20 mg/dL    Creatinine 0.40 (L) 0.50 - 0.90 mg/dL    Est, Glom Filt Rate >60 >60 mL/min/1.73m2    Calcium 9.3 8.6 - 10.4 mg/dL    Sodium 140 135 - 144 mmol/L    Potassium 3.7 3.7 - 5.3 mmol/L    Chloride 102 98 - 107 mmol/L    CO2 27 20 - 31 mmol/L    Anion Gap 11 9 - 17 mmol/L   Troponin   Result Value Ref Range    Troponin, High Sensitivity 9 0 - 14 ng/L   Brain Natriuretic Peptide   Result Value Ref Range    Pro- <300 pg/mL       All other labs were within normal range or not returned as of this dictation. EKG was read interpreted by myself. Showing normal sinus rhythm ventricular rate 67 left axis deviation left ventricular hypertrophy with QRS widening wavy baseline artifact. 1 isolated T wave inversion in V1 no acute ST or T wave changes of concern otherwise.   Old ekg from aug 2, 2022 used for comparison  RADIOLOGY:  CT CHEST W CONTRAST   Final Result   No acute pulmonary finding. Unchanged elevated right hemidiaphragm. Multilevel high-grade severe canal stenosis within thoracic spine, most   pronounced at T5-T6 and T7-T8. XR CHEST PORTABLE   Final Result   Low lung volumes with bibasilar opacities likely representing atelectasis   with infection not excluded. CT CHEST W CONTRAST    Result Date: 1/4/2023  EXAMINATION: CT OF THE CHEST WITH CONTRAST 1/4/2023 4:12 pm TECHNIQUE: CT of the chest was performed with the administration of intravenous contrast. Multiplanar reformatted images are provided for review. Automated exposure control, iterative reconstruction, and/or weight based adjustment of the mA/kV was utilized to reduce the radiation dose to as low as reasonably achievable. COMPARISON: Chest CT of 03/10/2022 HISTORY: ORDERING SYSTEM PROVIDED HISTORY: sob TECHNOLOGIST PROVIDED HISTORY: sob Decision Support Exception - unselect if not a suspected or confirmed emergency medical condition->Emergency Medical Condition (MA) Reason for Exam: Pt c/o cough and sob. Eval for pneumonia or other pathology. FINDINGS: CT chest: Lines and tubes:  None. Lungs and Airways and Pleura:  Central airways are patent. No lung consolidation. No pleural effusion. No pneumothorax. Elevated right hemidiaphragm. Lymph nodes: No pathologically enlarged mediastinal, hilar, lower cervical, or chest wall lymph nodes. Cardiovascular and Mediastinum: No acute aortic pathology. Cardiac chamber sizes appear to measure within normal limits on this non ECG gated study. No pericardial effusion. The thyroid gland is unremarkable. The esophagus is unremarkable. Bones/Soft tissues: No fracture. No definite suspicious lytic or blastic foci.   Multilevel disc and facet degenerative disease with severe multilevel high-grade canal stenosis of thoracic spine secondary to posterior osteophytic ridging. Findings are most pronounced at T5-T6 and T7-T8. Visualized upper abdomen: Status post cholecystectomy with pneumobilia. No acute pulmonary finding. Unchanged elevated right hemidiaphragm. Multilevel high-grade severe canal stenosis within thoracic spine, most pronounced at T5-T6 and T7-T8. XR CHEST PORTABLE    Result Date: 1/4/2023  EXAMINATION: ONE XRAY VIEW OF THE CHEST 1/4/2023 2:43 pm COMPARISON: 08/02/2022 HISTORY: ORDERING SYSTEM PROVIDED HISTORY: chest pain TECHNOLOGIST PROVIDED HISTORY: chest pain Reason for Exam: sob FINDINGS: Low lung volumes with bronchovascular crowding and bibasilar opacities. Normal cardiomediastinal silhouette. No pleural effusion or pneumothorax. No acute osseous abnormality. Low lung volumes with bibasilar opacities likely representing atelectasis with infection not excluded. I have reviewed the disposition diagnosis with the patient and or their family/guardian. I have answered their questions and givendischarge instructions. They voiced understanding of these instructions and did not have any further questions or complaints. PROCEDURES:  Unless otherwise noted below, none     Procedures    FINAL IMPRESSION      1.  Chronic cough          DISPOSITION/PLAN   DISPOSITION Decision To Discharge 01/04/2023 05:24:21 PM      PATIENT REFERRED TO:  Yared Olivera MD  [address]  [phone]    Call today      DISCHARGE MEDICATIONS:  New Prescriptions    No medications on file          (Please note that portions of this note were completed with a voice recognition program.  Efforts were made to edit the dictations but occasionally words are mis-transcribed.)    Gerber Dotson DO,(electronically signed)  Board Certified Emergency Physician       Gebrer Dotson DO  01/04/23 1455

## 2023-01-04 NOTE — DISCHARGE INSTRUCTIONS
Return to the emergency department if symptoms worsen or persist.  Follow-up with your pulmonologist as scheduled  Continue using your already prescribed inhalers as needed

## 2023-01-04 NOTE — ED NOTES
Pt presents to ED c/o SOB. Pt states this is an ongoing issue that comes and goes. Pt states she has an appointment to see a pulmonologist on 1/31/23. Pt also reports having a dry cough. Pt states she takes some OTC medications along with an albuterol inhaler that temporarily relieves symptoms but then they come back again. Pt speaks in clear and complete sentences, is eupneic, and has an oxygen saturation of 97% on room air. Lungs are clear bilaterally. Pt arrives A/Ox4, PWD, PMS intact. Pt resting on stretcher with call light in reach.      Myranda Buchanan RN  01/04/23 [id number]

## 2023-01-05 LAB
EKG ATRIAL RATE: 67 BPM
EKG P AXIS: 67 DEGREES
EKG P-R INTERVAL: 134 MS
EKG Q-T INTERVAL: 450 MS
EKG QRS DURATION: 140 MS
EKG QTC CALCULATION (BAZETT): 475 MS
EKG R AXIS: -45 DEGREES
EKG T AXIS: 24 DEGREES
EKG VENTRICULAR RATE: 67 BPM

## 2023-09-19 ENCOUNTER — HOSPITAL ENCOUNTER (EMERGENCY)
Facility: CLINIC | Age: 58
Discharge: ANOTHER HOSPITAL | End: 2023-09-19
Attending: EMERGENCY MEDICINE
Payer: MEDICAID

## 2023-09-19 ENCOUNTER — APPOINTMENT (OUTPATIENT)
Dept: CT IMAGING | Facility: CLINIC | Age: 58
End: 2023-09-19
Payer: MEDICAID

## 2023-09-19 ENCOUNTER — APPOINTMENT (OUTPATIENT)
Dept: GENERAL RADIOLOGY | Facility: CLINIC | Age: 58
End: 2023-09-19
Payer: MEDICAID

## 2023-09-19 VITALS
BODY MASS INDEX: 51.91 KG/M2 | SYSTOLIC BLOOD PRESSURE: 159 MMHG | TEMPERATURE: 98.3 F | HEIGHT: 63 IN | HEART RATE: 60 BPM | RESPIRATION RATE: 18 BRPM | WEIGHT: 293 LBS | DIASTOLIC BLOOD PRESSURE: 92 MMHG | OXYGEN SATURATION: 99 %

## 2023-09-19 DIAGNOSIS — R07.9 CHEST PAIN, UNSPECIFIED TYPE: Primary | ICD-10-CM

## 2023-09-19 LAB
ALBUMIN SERPL-MCNC: 4.5 G/DL (ref 3.5–5.2)
ALBUMIN/GLOB SERPL: 1.5 {RATIO} (ref 1–2.5)
ALP SERPL-CCNC: 71 U/L (ref 35–104)
ALT SERPL-CCNC: 18 U/L (ref 5–33)
ANION GAP SERPL CALCULATED.3IONS-SCNC: 11 MMOL/L (ref 9–17)
AST SERPL-CCNC: 18 U/L
BASOPHILS # BLD: 0 K/UL (ref 0–0.2)
BASOPHILS NFR BLD: 1 % (ref 0–2)
BILIRUB SERPL-MCNC: 0.3 MG/DL (ref 0.3–1.2)
BILIRUB UR QL STRIP: NEGATIVE
BUN SERPL-MCNC: 16 MG/DL (ref 6–20)
CALCIUM SERPL-MCNC: 9.3 MG/DL (ref 8.6–10.4)
CHLORIDE SERPL-SCNC: 107 MMOL/L (ref 98–107)
CLARITY UR: CLEAR
CO2 SERPL-SCNC: 24 MMOL/L (ref 20–31)
COLOR UR: YELLOW
COMMENT: NORMAL
CREAT SERPL-MCNC: 0.5 MG/DL (ref 0.5–0.9)
D DIMER PPP FEU-MCNC: 0.46 UG/ML FEU
EOSINOPHIL # BLD: 0.1 K/UL (ref 0–0.4)
EOSINOPHILS RELATIVE PERCENT: 2 % (ref 1–4)
ERYTHROCYTE [DISTWIDTH] IN BLOOD BY AUTOMATED COUNT: 14.8 % (ref 12.5–15.4)
GFR SERPL CREATININE-BSD FRML MDRD: >60 ML/MIN/1.73M2
GLUCOSE SERPL-MCNC: 125 MG/DL (ref 70–99)
GLUCOSE UR STRIP-MCNC: NEGATIVE MG/DL
HCT VFR BLD AUTO: 40.7 % (ref 36–46)
HGB BLD-MCNC: 13.8 G/DL (ref 12–16)
HGB UR QL STRIP.AUTO: NEGATIVE
KETONES UR STRIP-MCNC: NEGATIVE MG/DL
LEUKOCYTE ESTERASE UR QL STRIP: NEGATIVE
LIPASE SERPL-CCNC: 44 U/L (ref 13–60)
LYMPHOCYTES NFR BLD: 1.4 K/UL (ref 1–4.8)
LYMPHOCYTES RELATIVE PERCENT: 23 % (ref 24–44)
MAGNESIUM SERPL-MCNC: 1.7 MG/DL (ref 1.6–2.6)
MCH RBC QN AUTO: 29.2 PG (ref 26–34)
MCHC RBC AUTO-ENTMCNC: 33.9 G/DL (ref 31–37)
MCV RBC AUTO: 86.2 FL (ref 80–100)
MONOCYTES NFR BLD: 0.4 K/UL (ref 0.1–1.2)
MONOCYTES NFR BLD: 6 % (ref 2–11)
NEUTROPHILS NFR BLD: 68 % (ref 36–66)
NEUTS SEG NFR BLD: 4.1 K/UL (ref 1.8–7.7)
NITRITE UR QL STRIP: NEGATIVE
PH UR STRIP: 5.5 [PH] (ref 5–8)
PLATELET # BLD AUTO: 182 K/UL (ref 140–450)
PMV BLD AUTO: 9.1 FL (ref 6–12)
POTASSIUM SERPL-SCNC: 4.3 MMOL/L (ref 3.7–5.3)
PROT SERPL-MCNC: 7.5 G/DL (ref 6.4–8.3)
PROT UR STRIP-MCNC: NEGATIVE MG/DL
RBC # BLD AUTO: 4.72 M/UL (ref 4–5.2)
SODIUM SERPL-SCNC: 142 MMOL/L (ref 135–144)
SP GR UR STRIP: 1.02 (ref 1–1.03)
TROPONIN I SERPL HS-MCNC: 7 NG/L (ref 0–14)
TROPONIN I SERPL HS-MCNC: 7 NG/L (ref 0–14)
UROBILINOGEN UR STRIP-ACNC: NORMAL EU/DL (ref 0–1)
WBC OTHER # BLD: 6.1 K/UL (ref 3.5–11)

## 2023-09-19 PROCEDURE — 71045 X-RAY EXAM CHEST 1 VIEW: CPT

## 2023-09-19 PROCEDURE — 71275 CT ANGIOGRAPHY CHEST: CPT

## 2023-09-19 PROCEDURE — 81003 URINALYSIS AUTO W/O SCOPE: CPT

## 2023-09-19 PROCEDURE — 96374 THER/PROPH/DIAG INJ IV PUSH: CPT

## 2023-09-19 PROCEDURE — 85025 COMPLETE CBC W/AUTO DIFF WBC: CPT

## 2023-09-19 PROCEDURE — 83735 ASSAY OF MAGNESIUM: CPT

## 2023-09-19 PROCEDURE — 93005 ELECTROCARDIOGRAM TRACING: CPT | Performed by: EMERGENCY MEDICINE

## 2023-09-19 PROCEDURE — 6360000002 HC RX W HCPCS: Performed by: EMERGENCY MEDICINE

## 2023-09-19 PROCEDURE — 84484 ASSAY OF TROPONIN QUANT: CPT

## 2023-09-19 PROCEDURE — 80053 COMPREHEN METABOLIC PANEL: CPT

## 2023-09-19 PROCEDURE — 99285 EMERGENCY DEPT VISIT HI MDM: CPT

## 2023-09-19 PROCEDURE — 6360000004 HC RX CONTRAST MEDICATION: Performed by: EMERGENCY MEDICINE

## 2023-09-19 PROCEDURE — 36415 COLL VENOUS BLD VENIPUNCTURE: CPT

## 2023-09-19 PROCEDURE — 85379 FIBRIN DEGRADATION QUANT: CPT

## 2023-09-19 PROCEDURE — 2580000003 HC RX 258: Performed by: EMERGENCY MEDICINE

## 2023-09-19 PROCEDURE — 83690 ASSAY OF LIPASE: CPT

## 2023-09-19 RX ORDER — SODIUM CHLORIDE 0.9 % (FLUSH) 0.9 %
10 SYRINGE (ML) INJECTION PRN
Status: DISCONTINUED | OUTPATIENT
Start: 2023-09-19 | End: 2023-09-19 | Stop reason: HOSPADM

## 2023-09-19 RX ORDER — HYDROMORPHONE HYDROCHLORIDE 1 MG/ML
1 INJECTION, SOLUTION INTRAMUSCULAR; INTRAVENOUS; SUBCUTANEOUS ONCE
Status: DISCONTINUED | OUTPATIENT
Start: 2023-09-19 | End: 2023-09-19 | Stop reason: HOSPADM

## 2023-09-19 RX ORDER — 0.9 % SODIUM CHLORIDE 0.9 %
70 INTRAVENOUS SOLUTION INTRAVENOUS ONCE
Status: COMPLETED | OUTPATIENT
Start: 2023-09-19 | End: 2023-09-19

## 2023-09-19 RX ORDER — MORPHINE SULFATE 4 MG/ML
4 INJECTION, SOLUTION INTRAMUSCULAR; INTRAVENOUS ONCE
Status: COMPLETED | OUTPATIENT
Start: 2023-09-19 | End: 2023-09-19

## 2023-09-19 RX ADMIN — IOPAMIDOL 75 ML: 755 INJECTION, SOLUTION INTRAVENOUS at 12:49

## 2023-09-19 RX ADMIN — SODIUM CHLORIDE 70 ML: 9 INJECTION, SOLUTION INTRAVENOUS at 12:49

## 2023-09-19 RX ADMIN — SODIUM CHLORIDE, PRESERVATIVE FREE 10 ML: 5 INJECTION INTRAVENOUS at 12:50

## 2023-09-19 RX ADMIN — MORPHINE SULFATE 4 MG: 4 INJECTION, SOLUTION INTRAMUSCULAR; INTRAVENOUS at 09:53

## 2023-09-19 ASSESSMENT — PAIN DESCRIPTION - LOCATION
LOCATION: CHEST

## 2023-09-19 ASSESSMENT — PAIN DESCRIPTION - ORIENTATION
ORIENTATION: LEFT

## 2023-09-19 ASSESSMENT — PAIN SCALES - GENERAL: PAINLEVEL_OUTOF10: 8

## 2023-09-19 ASSESSMENT — ENCOUNTER SYMPTOMS
ABDOMINAL PAIN: 0
BACK PAIN: 0
COUGH: 0
SORE THROAT: 0
EYE PAIN: 0
SHORTNESS OF BREATH: 0
DIARRHEA: 0
NAUSEA: 0
VOMITING: 0
RHINORRHEA: 0

## 2023-09-19 ASSESSMENT — PAIN - FUNCTIONAL ASSESSMENT: PAIN_FUNCTIONAL_ASSESSMENT: 0-10

## 2023-09-19 ASSESSMENT — PAIN DESCRIPTION - DESCRIPTORS
DESCRIPTORS: DISCOMFORT
DESCRIPTORS: PRESSURE
DESCRIPTORS: DISCOMFORT

## 2023-09-19 NOTE — ED PROVIDER NOTES
Efforts were made to edit the dictations but occasionally words are mis-transcribed.)    Ursula Harding,   Attending Emergency Physician       Ursula Harding DO  09/19/23 2409

## 2023-09-19 NOTE — ED NOTES
[facility] paging pt's PCP.       Susana Atwood RN  09/19/23 5638

## 2023-09-19 NOTE — ED NOTES
Dr. Nuvia Ulloa reports due to pt's insurance, Pt is requesting to go to [address] [place], [place] [address]ing [facility] for admission.      Natalia Albert RN  09/19/23 7798

## 2023-09-19 NOTE — ED NOTES
The following labs have been labeled with pt sticker and walked to lab:     [x] Stephon Bullock RN  09/19/23 [id number]

## 2023-09-19 NOTE — ED NOTES
Report given to [facility] personnel. Al questions answered at this time.       Rod Gilman RN  09/19/23 0839

## 2023-09-20 LAB
EKG ATRIAL RATE: 65 BPM
EKG P AXIS: -16 DEGREES
EKG P-R INTERVAL: 100 MS
EKG Q-T INTERVAL: 428 MS
EKG QRS DURATION: 132 MS
EKG QTC CALCULATION (BAZETT): 445 MS
EKG R AXIS: -42 DEGREES
EKG T AXIS: -3 DEGREES
EKG VENTRICULAR RATE: 65 BPM

## 2024-08-25 ENCOUNTER — HOSPITAL ENCOUNTER (EMERGENCY)
Facility: CLINIC | Age: 59
Discharge: HOME | End: 2024-08-25
Attending: STUDENT IN AN ORGANIZED HEALTH CARE EDUCATION/TRAINING PROGRAM
Payer: MEDICAID

## 2024-08-25 VITALS
SYSTOLIC BLOOD PRESSURE: 171 MMHG | BODY MASS INDEX: 48.82 KG/M2 | OXYGEN SATURATION: 98 % | TEMPERATURE: 98.2 F | RESPIRATION RATE: 17 BRPM | DIASTOLIC BLOOD PRESSURE: 88 MMHG | HEART RATE: 79 BPM | HEIGHT: 65 IN | WEIGHT: 293 LBS

## 2024-08-25 DIAGNOSIS — N89.8 VAGINAL ITCHING: ICD-10-CM

## 2024-08-25 DIAGNOSIS — M79.604 RIGHT LEG PAIN: Primary | ICD-10-CM

## 2024-08-25 LAB
CANDIDA SPECIES: NEGATIVE
D DIMER PPP FEU-MCNC: 0.34 UG/ML FEU
GARDNERELLA VAGINALIS: NEGATIVE
SOURCE: NORMAL
TRICHOMONAS: NEGATIVE

## 2024-08-25 PROCEDURE — 36415 COLL VENOUS BLD VENIPUNCTURE: CPT

## 2024-08-25 PROCEDURE — 99283 EMERGENCY DEPT VISIT LOW MDM: CPT

## 2024-08-25 PROCEDURE — 87510 GARDNER VAG DNA DIR PROBE: CPT

## 2024-08-25 PROCEDURE — 87591 N.GONORRHOEAE DNA AMP PROB: CPT

## 2024-08-25 PROCEDURE — 85379 FIBRIN DEGRADATION QUANT: CPT

## 2024-08-25 PROCEDURE — 87480 CANDIDA DNA DIR PROBE: CPT

## 2024-08-25 PROCEDURE — 87491 CHLMYD TRACH DNA AMP PROBE: CPT

## 2024-08-25 PROCEDURE — 87660 TRICHOMONAS VAGIN DIR PROBE: CPT

## 2024-08-25 PROCEDURE — 82671 ASSAY OF ESTROGENS: CPT

## 2024-08-25 ASSESSMENT — PAIN - FUNCTIONAL ASSESSMENT: PAIN_FUNCTIONAL_ASSESSMENT: WONG-BAKER FACES

## 2024-08-25 ASSESSMENT — PAIN SCALES - WONG BAKER: WONGBAKER_NUMERICALRESPONSE: HURTS A LITTLE BIT

## 2024-08-25 NOTE — ED PROVIDER NOTES
[facility]  Emergency Department Encounter  [facility]       Pt Name:Liza Taylor  MRN: [id number]  Birthdate 1965  Date of evaluation: 8/25/24  PCP:  Frances Avendano MD      CHIEF COMPLAINT       Chief Complaint   Patient presents with    Leg Pain     Right leg pain since yesterday. No injury       HISTORY OF PRESENT ILLNESS     Liza Taylor is a 59 y.o. female who presents via personal vehicle with 2 separate complaints.  First complaint is for right lateral leg pain.  Acute in onset.  Started yesterday during the day.  No injury.  Did start all of a sudden.  No history of DVTs or PEs.  No anticoagulation.  No recent travel.  No chest pain.  No shortness of breath.  Did take Tylenol prior to arrival with some relief of discomfort.  No numbness.  No tingling.  Secondary concern for continued vaginal itching.  Patient reports vaginal itching for quite some time.  She has been seen by her primary care provider multiple times.  She does not follow with OB/GYN.  She did go through menopause around age 39.  She has not previously had issues with vaginal itching.  On visits with primary care provider patient initially placed on metronidazole 2015 mg orally twice daily which patient took through full completion had persistence of symptoms, second course of 500 mg p.o. metronidazole has been completed as well and patient reports no changes to discomfort.  They have also tried intravaginal gels.  Patient has no concerns for STDs.  She denies lesions.  She denies rashes.  Reports that itching is uncomfortable, constantly there, improves with itching and with hot showers.  No vaginal bleeding, menopause at age 39.  No fevers.  No chills.  No new exposures as far as patient knows.    PAST MEDICAL / SURGICAL / SOCIAL / FAMILY HISTORY      has a past medical history of Asthma and Hypertension.       has a past surgical history that includes Knee arthroscopy (Bilateral);  discussed this with patient.  I am recommending close follow-up with primary care provider to discuss further evaluation and outpatient workup, if DVT ultrasound is negative I am recommending follow-up with primary care provider for further workup, I am also recommending OB/GYN follow-up as patient does not have an OB/GYN.  Vaginitis probe negative.  G/C results will be available via [facility].  If positive patient will be contacted.    I discussed this plan with patient.  At this time I do believe patient is safe and stable for discharge home.  Patient struck to follow-up with primary care provider, OB/GYN, return tomorrow morning for DVT ultrasound.  Patient comfortable compliant with this plan. Patient discharged stable condition.                     CONSULTS:  None        The patient and/or family and I have discussed the diagnosis(es) and risks, and we agree with discharging home to follow-up with their pertinent providers. The patient appears stable for discharge and has been instructed to return immediately for new concerning symptoms or if the symptoms worsen in any way. The patient understands that at this time there is no evidence for a more malignant underlying process, but the patient also understands that early in the process of an illness or injury, an emergency department workup can be falsely reassuring. Routine discharge counseling was given, and the patient understands that worsening, changing or persistent symptoms should prompt an immediate call or follow up with their primary physician or return to the emergency department.     I have reviewed the disposition diagnosis with the patient and or their family/guardian. I have answered their questions and given discharge instructions. They voiced understanding of these instructions and did not have any further questions or complaints.    FINAL IMPRESSION      1. Right leg pain    2. Vaginal itching          DISPOSITION / PLAN     DISPOSITION Decision

## 2024-08-25 NOTE — DISCHARGE INSTRUCTIONS
SUMMARY OF YOUR VISIT    Today you were seen for right leg pain and vaginal itching.  We discussed the plan including obtaining an estrogen level which will not result today although this will be available to your primary care provider.  Please call your primary care provider tomorrow morning to set up a follow-up appointment to discuss further treatment options for vaginal itching.  I have provided you with information to follow-up with an OB/GYN, I provided you 2 separate OB/GYN's that I recommend.  I will call you to inform you of your vaginitis probe results.  I recommend close follow-up with your PCP and OB/GYN team as above.  If you have any new, changing, worsening or return of symptoms I recommend return to the emerged department for reevaluation    Please continue to take your home medication as previously prescribed, I have made no changes to your home medications.        You can return to our or another Emergency Department as needed or for worsening symptoms of chest pain, shortness of breath, high fevers not relieved by acetaminophen (Tylenol) and/or ibuprofen (Motrin / Advil), chills, feeling of your heart fluttering or racing, persistent nausea and/or vomiting, vomiting up blood, blood in your stool, loss of consciousness, numbness, weakness or tingling in the arms or legs or change in color of the extremities, changes in mental status, persistent headache, blurry vision, loss of bladder / bowel control, if you are unable to follow up with your physician, or other any other care or concern.    Thank You!    On behalf of the Emergency Department staff and team, I would like to thank you for allowing us the opportunity to participate in your health care and evaluation today.

## 2024-08-26 ENCOUNTER — HOSPITAL ENCOUNTER (OUTPATIENT)
Facility: CLINIC | Age: 59
Discharge: HOME | End: 2024-08-28
Payer: MEDICAID

## 2024-08-26 ENCOUNTER — HOSPITAL ENCOUNTER (OUTPATIENT)
Dept: ULTRASOUND IMAGING | Facility: CLINIC | Age: 59
Discharge: HOME | End: 2024-08-28
Payer: MEDICAID

## 2024-08-26 DIAGNOSIS — M79.604 RIGHT LEG PAIN: ICD-10-CM

## 2024-08-26 LAB
C TRACH DNA SPEC QL PROBE+SIG AMP: NEGATIVE
N GONORRHOEA DNA SPEC QL PROBE+SIG AMP: NEGATIVE
SPECIMEN DESCRIPTION: NORMAL

## 2024-08-26 PROCEDURE — 93971 EXTREMITY STUDY: CPT

## 2024-08-31 LAB
ESTRADIOL LEVEL: 47.7 PG/ML
ESTROGEN TOTAL: 91.9 PG/ML
ESTRONE SERPL-MCNC: 44.2 PG/ML

## 2024-12-03 ENCOUNTER — APPOINTMENT (OUTPATIENT)
Dept: ULTRASOUND IMAGING | Facility: CLINIC | Age: 59
End: 2024-12-03
Payer: MEDICAID

## 2024-12-03 ENCOUNTER — HOSPITAL ENCOUNTER (EMERGENCY)
Facility: CLINIC | Age: 59
Discharge: HOME | End: 2024-12-03
Attending: EMERGENCY MEDICINE
Payer: MEDICAID

## 2024-12-03 VITALS
DIASTOLIC BLOOD PRESSURE: 101 MMHG | BODY MASS INDEX: 48.82 KG/M2 | OXYGEN SATURATION: 98 % | HEIGHT: 65 IN | SYSTOLIC BLOOD PRESSURE: 194 MMHG | RESPIRATION RATE: 18 BRPM | HEART RATE: 76 BPM | WEIGHT: 293 LBS | TEMPERATURE: 98.1 F

## 2024-12-03 DIAGNOSIS — M79.604 RIGHT LEG PAIN: Primary | ICD-10-CM

## 2024-12-03 PROCEDURE — 99284 EMERGENCY DEPT VISIT MOD MDM: CPT

## 2024-12-03 PROCEDURE — 93971 EXTREMITY STUDY: CPT

## 2024-12-03 PROCEDURE — 6360000002 HC RX W HCPCS: Performed by: EMERGENCY MEDICINE

## 2024-12-03 RX ORDER — DEXAMETHASONE 4 MG/1
8 TABLET ORAL ONCE
Status: COMPLETED | OUTPATIENT
Start: 2024-12-03 | End: 2024-12-03

## 2024-12-03 RX ORDER — PREDNISONE 10 MG/1
10 TABLET ORAL SEE ADMIN INSTRUCTIONS
Qty: 17 TABLET | Refills: 0 | Status: SHIPPED | OUTPATIENT
Start: 2024-12-03 | End: 2024-12-09

## 2024-12-03 RX ADMIN — DEXAMETHASONE 8 MG: 4 TABLET ORAL at 09:34

## 2024-12-03 ASSESSMENT — PAIN SCALES - GENERAL: PAINLEVEL_OUTOF10: 10

## 2024-12-03 ASSESSMENT — ENCOUNTER SYMPTOMS
SORE THROAT: 0
SHORTNESS OF BREATH: 0
DIARRHEA: 0
VOMITING: 0

## 2024-12-03 ASSESSMENT — PAIN DESCRIPTION - ONSET: ONSET: ON-GOING

## 2024-12-03 ASSESSMENT — PAIN - FUNCTIONAL ASSESSMENT
PAIN_FUNCTIONAL_ASSESSMENT: 0-10
PAIN_FUNCTIONAL_ASSESSMENT: ACTIVITIES ARE NOT PREVENTED

## 2024-12-03 ASSESSMENT — PAIN DESCRIPTION - DESCRIPTORS: DESCRIPTORS: ACHING

## 2024-12-03 ASSESSMENT — PAIN DESCRIPTION - ORIENTATION: ORIENTATION: LOWER;RIGHT

## 2024-12-03 ASSESSMENT — PAIN DESCRIPTION - PAIN TYPE: TYPE: ACUTE PAIN

## 2024-12-03 ASSESSMENT — PAIN DESCRIPTION - LOCATION: LOCATION: LEG

## 2024-12-03 ASSESSMENT — PAIN DESCRIPTION - FREQUENCY: FREQUENCY: CONTINUOUS

## 2024-12-03 NOTE — ED PROVIDER NOTES
[facility]  [address] [facility] [address]  Phone: [phone]        [facility] ED  EMERGENCY DEPARTMENT ENCOUNTER      Pt Name: Liza Taylor  MRN: [id number]  Birthdate 1965  Date of evaluation: 12/3/2024  Provider: Jon Serna DO    CHIEF COMPLAINT       Chief Complaint   Patient presents with    Leg Pain         HISTORY OF PRESENT ILLNESS   (Location/Symptom, Timing/Onset,Context/Setting, Quality, Duration, Modifying Factors, Severity)  Note limiting factors.   Liza Taylor is a 59 y.o. female who presents to the emergency department for the evaluation of right calf pain and swelling.  She said she had this happen about 4 months ago, she came to this ER, she had a negative D-dimer and a negative ultrasound and followed up with the vein specialist who was not really impressed.  She said it did go away but it is now back.  No injury.  No numbness or weakness in the right lower extremity, just pain and swelling in the right calf.    Nursing Notes were reviewed.    REVIEW OF SYSTEMS    (2-9systems for level 4, 10 or more for level 5)     Review of Systems   Constitutional:  Negative for fever.   HENT:  Negative for sore throat.    Respiratory:  Negative for shortness of breath.    Cardiovascular:  Negative for chest pain.   Gastrointestinal:  Negative for diarrhea and vomiting.   Genitourinary:  Negative for dysuria.   Musculoskeletal:         Right calf pain   Skin:  Negative for rash.   Neurological:  Negative for weakness.   All other systems reviewed and are negative.      Except asnoted above the remainder of the review of systems was reviewed and negative.       PAST MEDICAL HISTORY     Past Medical History:   Diagnosis Date    Asthma     Hypertension          SURGICAL HISTORY       Past Surgical History:   Procedure Laterality Date    APPENDECTOMY      CHOLECYSTECTOMY      KNEE ARTHROSCOPY Bilateral          CURRENT MEDICATIONS     Previous Medications    ALBUTEROL

## 2024-12-03 NOTE — ED NOTES
Pt presents to ED c/o right lower leg pain for the past few days. Pt states she fell approximately 6 weeks ago and the leg has been giving her issues ever since. Pt states she was evaluated for a blood clot which was negative. No obvious injury or deformity noted but leg appears slightly bruised and swollen. Pt ambulates unassisted with steady gait. Pt arrives A/Ox4, PWD, PMS intact. Pt resting on stretcher with call light in reach.